# Patient Record
Sex: FEMALE | Race: WHITE | NOT HISPANIC OR LATINO | URBAN - METROPOLITAN AREA
[De-identification: names, ages, dates, MRNs, and addresses within clinical notes are randomized per-mention and may not be internally consistent; named-entity substitution may affect disease eponyms.]

---

## 2017-09-13 ENCOUNTER — OFFICE (AMBULATORY)
Dept: URBAN - METROPOLITAN AREA CLINIC 64 | Facility: CLINIC | Age: 42
End: 2017-09-13
Payer: COMMERCIAL

## 2017-09-13 VITALS
WEIGHT: 230 LBS | HEIGHT: 65 IN | DIASTOLIC BLOOD PRESSURE: 68 MMHG | SYSTOLIC BLOOD PRESSURE: 104 MMHG | HEART RATE: 74 BPM

## 2017-09-13 DIAGNOSIS — K21.9 GASTRO-ESOPHAGEAL REFLUX DISEASE WITHOUT ESOPHAGITIS: ICD-10-CM

## 2017-09-13 DIAGNOSIS — E73.9 LACTOSE INTOLERANCE, UNSPECIFIED: ICD-10-CM

## 2017-09-13 DIAGNOSIS — R14.0 ABDOMINAL DISTENSION (GASEOUS): ICD-10-CM

## 2017-09-13 DIAGNOSIS — K31.84 GASTROPARESIS: ICD-10-CM

## 2017-09-13 DIAGNOSIS — K58.9 IRRITABLE BOWEL SYNDROME WITHOUT DIARRHEA: ICD-10-CM

## 2017-09-13 DIAGNOSIS — R10.11 RIGHT UPPER QUADRANT PAIN: ICD-10-CM

## 2017-09-13 PROCEDURE — 99214 OFFICE O/P EST MOD 30 MIN: CPT | Performed by: NURSE PRACTITIONER

## 2017-09-13 RX ORDER — PANTOPRAZOLE SODIUM 40 MG/1
40 TABLET, DELAYED RELEASE ORAL
Qty: 90 | Refills: 3 | Status: COMPLETED
End: 2017-09-25

## 2017-09-13 RX ORDER — LACTASE 3000 UNIT
TABLET ORAL
Qty: 30 | Refills: -1 | Status: ACTIVE
Start: 2017-09-13

## 2018-02-21 ENCOUNTER — OFFICE (AMBULATORY)
Dept: URBAN - METROPOLITAN AREA CLINIC 64 | Facility: CLINIC | Age: 43
End: 2018-02-21
Payer: COMMERCIAL

## 2018-02-21 VITALS
SYSTOLIC BLOOD PRESSURE: 104 MMHG | WEIGHT: 230 LBS | DIASTOLIC BLOOD PRESSURE: 74 MMHG | HEART RATE: 74 BPM | HEIGHT: 65 IN

## 2018-02-21 DIAGNOSIS — K21.9 GASTRO-ESOPHAGEAL REFLUX DISEASE WITHOUT ESOPHAGITIS: ICD-10-CM

## 2018-02-21 DIAGNOSIS — K59.00 CONSTIPATION, UNSPECIFIED: ICD-10-CM

## 2018-02-21 PROCEDURE — 99214 OFFICE O/P EST MOD 30 MIN: CPT | Performed by: NURSE PRACTITIONER

## 2018-02-21 RX ORDER — DICYCLOMINE HYDROCHLORIDE 20 MG/1
TABLET ORAL
Qty: 90 | Refills: 3 | Status: COMPLETED
End: 2023-01-12

## 2018-03-09 ENCOUNTER — OFFICE (AMBULATORY)
Dept: URBAN - METROPOLITAN AREA CLINIC 64 | Facility: CLINIC | Age: 43
End: 2018-03-09
Payer: COMMERCIAL

## 2018-03-09 VITALS
DIASTOLIC BLOOD PRESSURE: 70 MMHG | HEART RATE: 72 BPM | SYSTOLIC BLOOD PRESSURE: 108 MMHG | HEIGHT: 65 IN | WEIGHT: 221 LBS

## 2018-03-09 DIAGNOSIS — K31.84 GASTROPARESIS: ICD-10-CM

## 2018-03-09 DIAGNOSIS — R14.0 ABDOMINAL DISTENSION (GASEOUS): ICD-10-CM

## 2018-03-09 DIAGNOSIS — K58.9 IRRITABLE BOWEL SYNDROME WITHOUT DIARRHEA: ICD-10-CM

## 2018-03-09 DIAGNOSIS — K21.9 GASTRO-ESOPHAGEAL REFLUX DISEASE WITHOUT ESOPHAGITIS: ICD-10-CM

## 2018-03-09 DIAGNOSIS — R10.11 RIGHT UPPER QUADRANT PAIN: ICD-10-CM

## 2018-03-09 PROCEDURE — 99214 OFFICE O/P EST MOD 30 MIN: CPT | Performed by: NURSE PRACTITIONER

## 2019-09-06 ENCOUNTER — OFFICE (AMBULATORY)
Dept: URBAN - METROPOLITAN AREA CLINIC 64 | Facility: CLINIC | Age: 44
End: 2019-09-06
Payer: COMMERCIAL

## 2019-09-06 VITALS
HEIGHT: 65 IN | HEART RATE: 80 BPM | WEIGHT: 218 LBS | DIASTOLIC BLOOD PRESSURE: 72 MMHG | SYSTOLIC BLOOD PRESSURE: 109 MMHG

## 2019-09-06 DIAGNOSIS — R11.0 NAUSEA: ICD-10-CM

## 2019-09-06 DIAGNOSIS — R14.0 ABDOMINAL DISTENSION (GASEOUS): ICD-10-CM

## 2019-09-06 DIAGNOSIS — K21.9 GASTRO-ESOPHAGEAL REFLUX DISEASE WITHOUT ESOPHAGITIS: ICD-10-CM

## 2019-09-06 DIAGNOSIS — K59.00 CONSTIPATION, UNSPECIFIED: ICD-10-CM

## 2019-09-06 PROCEDURE — 99214 OFFICE O/P EST MOD 30 MIN: CPT | Performed by: NURSE PRACTITIONER

## 2019-09-06 RX ORDER — OMEPRAZOLE 40 MG/1
CAPSULE, DELAYED RELEASE ORAL
Qty: 90 | Refills: 4 | Status: COMPLETED
Start: 2017-09-25 | End: 2020-10-13

## 2019-09-06 RX ORDER — DICYCLOMINE HYDROCHLORIDE 20 MG/1
TABLET ORAL
Qty: 90 | Refills: 3 | Status: COMPLETED
End: 2023-01-12

## 2019-09-06 RX ORDER — METOCLOPRAMIDE 5 MG/1
5 TABLET ORAL
Qty: 90 | Refills: 3 | Status: COMPLETED
End: 2021-12-03

## 2020-11-13 ENCOUNTER — OFFICE (AMBULATORY)
Dept: URBAN - METROPOLITAN AREA CLINIC 64 | Facility: CLINIC | Age: 45
End: 2020-11-13

## 2020-11-13 VITALS
WEIGHT: 239 LBS | HEART RATE: 75 BPM | HEIGHT: 65 IN | DIASTOLIC BLOOD PRESSURE: 87 MMHG | SYSTOLIC BLOOD PRESSURE: 128 MMHG

## 2020-11-13 DIAGNOSIS — K58.9 IRRITABLE BOWEL SYNDROME WITHOUT DIARRHEA: ICD-10-CM

## 2020-11-13 DIAGNOSIS — R11.0 NAUSEA: ICD-10-CM

## 2020-11-13 DIAGNOSIS — K21.9 GASTRO-ESOPHAGEAL REFLUX DISEASE WITHOUT ESOPHAGITIS: ICD-10-CM

## 2020-11-13 DIAGNOSIS — K31.84 GASTROPARESIS: ICD-10-CM

## 2020-11-13 PROCEDURE — 99214 OFFICE O/P EST MOD 30 MIN: CPT | Performed by: NURSE PRACTITIONER

## 2020-11-13 RX ORDER — METOCLOPRAMIDE 5 MG/1
5 TABLET ORAL
Qty: 90 | Refills: 3 | Status: COMPLETED
End: 2021-12-03

## 2020-11-13 RX ORDER — PANTOPRAZOLE SODIUM 40 MG/1
TABLET, DELAYED RELEASE ORAL
Qty: 30 | Refills: 0 | Status: COMPLETED
Start: 2019-10-09 | End: 2020-11-13

## 2020-11-13 RX ORDER — ONDANSETRON HYDROCHLORIDE 8 MG/1
24 TABLET, FILM COATED ORAL
Qty: 30 | Refills: 3 | Status: COMPLETED
Start: 2018-02-05 | End: 2021-02-26

## 2020-11-13 RX ORDER — DICYCLOMINE HYDROCHLORIDE 20 MG/1
TABLET ORAL
Qty: 90 | Refills: 3 | Status: COMPLETED
End: 2023-01-12

## 2020-11-13 RX ORDER — OMEPRAZOLE 40 MG/1
40 CAPSULE, DELAYED RELEASE ORAL
Qty: 90 | Refills: 3 | Status: COMPLETED
Start: 2020-11-13 | End: 2021-03-04

## 2021-04-21 PROBLEM — E66.813 OBESITY, CLASS III, BMI 40-49.9 (MORBID OBESITY): Chronic | Status: ACTIVE | Noted: 2021-04-21

## 2021-12-03 ENCOUNTER — OFFICE (AMBULATORY)
Dept: URBAN - METROPOLITAN AREA CLINIC 64 | Facility: CLINIC | Age: 46
End: 2021-12-03

## 2021-12-03 VITALS
DIASTOLIC BLOOD PRESSURE: 91 MMHG | HEART RATE: 91 BPM | SYSTOLIC BLOOD PRESSURE: 127 MMHG | WEIGHT: 253 LBS | HEIGHT: 65 IN

## 2021-12-03 DIAGNOSIS — K31.84 GASTROPARESIS: ICD-10-CM

## 2021-12-03 DIAGNOSIS — K59.00 CONSTIPATION, UNSPECIFIED: ICD-10-CM

## 2021-12-03 DIAGNOSIS — K21.9 GASTRO-ESOPHAGEAL REFLUX DISEASE WITHOUT ESOPHAGITIS: ICD-10-CM

## 2021-12-03 PROCEDURE — 99214 OFFICE O/P EST MOD 30 MIN: CPT | Performed by: NURSE PRACTITIONER

## 2021-12-03 RX ORDER — METOCLOPRAMIDE 5 MG/1
5 TABLET ORAL
Qty: 90 | Refills: 3 | Status: COMPLETED
End: 2021-12-03

## 2021-12-03 RX ORDER — DICYCLOMINE HYDROCHLORIDE 20 MG/1
TABLET ORAL
Qty: 90 | Refills: 3 | Status: COMPLETED
End: 2023-01-12

## 2021-12-03 RX ORDER — PANTOPRAZOLE SODIUM 40 MG/1
40 TABLET, DELAYED RELEASE ORAL
Qty: 90 | Refills: 4 | Status: ACTIVE
Start: 2021-10-05

## 2023-01-12 ENCOUNTER — OFFICE (AMBULATORY)
Dept: URBAN - METROPOLITAN AREA CLINIC 64 | Facility: CLINIC | Age: 48
End: 2023-01-12

## 2023-01-12 VITALS
HEIGHT: 65 IN | DIASTOLIC BLOOD PRESSURE: 89 MMHG | SYSTOLIC BLOOD PRESSURE: 121 MMHG | HEART RATE: 75 BPM | WEIGHT: 244 LBS

## 2023-01-12 DIAGNOSIS — R13.10 DYSPHAGIA, UNSPECIFIED: ICD-10-CM

## 2023-01-12 DIAGNOSIS — K58.9 IRRITABLE BOWEL SYNDROME WITHOUT DIARRHEA: ICD-10-CM

## 2023-01-12 DIAGNOSIS — E73.9 LACTOSE INTOLERANCE, UNSPECIFIED: ICD-10-CM

## 2023-01-12 DIAGNOSIS — K31.84 GASTROPARESIS: ICD-10-CM

## 2023-01-12 DIAGNOSIS — K21.9 GASTRO-ESOPHAGEAL REFLUX DISEASE WITHOUT ESOPHAGITIS: ICD-10-CM

## 2023-01-12 PROCEDURE — 99214 OFFICE O/P EST MOD 30 MIN: CPT | Performed by: NURSE PRACTITIONER

## 2023-01-12 RX ORDER — PANTOPRAZOLE SODIUM 40 MG/1
40 TABLET, DELAYED RELEASE ORAL
Qty: 90 | Refills: 4 | Status: ACTIVE
Start: 2021-10-05

## 2023-01-12 RX ORDER — DICYCLOMINE HYDROCHLORIDE 20 MG/1
TABLET ORAL
Qty: 30 | Refills: 3 | Status: ACTIVE

## 2023-02-03 ENCOUNTER — ON CAMPUS - OUTPATIENT (AMBULATORY)
Dept: URBAN - METROPOLITAN AREA HOSPITAL 85 | Facility: HOSPITAL | Age: 48
End: 2023-02-03
Payer: COMMERCIAL

## 2023-02-03 DIAGNOSIS — R13.10 DYSPHAGIA, UNSPECIFIED: ICD-10-CM

## 2023-02-03 DIAGNOSIS — K21.9 GASTRO-ESOPHAGEAL REFLUX DISEASE WITHOUT ESOPHAGITIS: ICD-10-CM

## 2023-02-03 PROCEDURE — 43235 EGD DIAGNOSTIC BRUSH WASH: CPT | Performed by: INTERNAL MEDICINE

## 2023-02-03 PROCEDURE — 43450 DILATE ESOPHAGUS 1/MULT PASS: CPT | Performed by: INTERNAL MEDICINE

## 2024-04-19 ENCOUNTER — OFFICE VISIT (OUTPATIENT)
Dept: FAMILY MEDICINE CLINIC | Facility: CLINIC | Age: 49
End: 2024-04-19
Payer: COMMERCIAL

## 2024-04-19 ENCOUNTER — LAB (OUTPATIENT)
Dept: FAMILY MEDICINE CLINIC | Facility: CLINIC | Age: 49
End: 2024-04-19
Payer: COMMERCIAL

## 2024-04-19 VITALS
WEIGHT: 200.6 LBS | BODY MASS INDEX: 33.42 KG/M2 | OXYGEN SATURATION: 98 % | RESPIRATION RATE: 16 BRPM | DIASTOLIC BLOOD PRESSURE: 74 MMHG | SYSTOLIC BLOOD PRESSURE: 118 MMHG | HEIGHT: 65 IN | HEART RATE: 72 BPM

## 2024-04-19 DIAGNOSIS — E04.1 THYROID NODULE: Primary | ICD-10-CM

## 2024-04-19 DIAGNOSIS — E04.1 THYROID NODULE: ICD-10-CM

## 2024-04-19 DIAGNOSIS — K21.9 GASTROESOPHAGEAL REFLUX DISEASE WITHOUT ESOPHAGITIS: ICD-10-CM

## 2024-04-19 DIAGNOSIS — R07.0 THROAT DISCOMFORT: ICD-10-CM

## 2024-04-19 LAB — TSH SERPL DL<=0.05 MIU/L-ACNC: 1.35 UIU/ML (ref 0.27–4.2)

## 2024-04-19 PROCEDURE — 84443 ASSAY THYROID STIM HORMONE: CPT | Performed by: INTERNAL MEDICINE

## 2024-04-19 PROCEDURE — 36415 COLL VENOUS BLD VENIPUNCTURE: CPT

## 2024-04-19 PROCEDURE — 85025 COMPLETE CBC W/AUTO DIFF WBC: CPT | Performed by: INTERNAL MEDICINE

## 2024-04-19 PROCEDURE — 99214 OFFICE O/P EST MOD 30 MIN: CPT | Performed by: INTERNAL MEDICINE

## 2024-04-19 RX ORDER — ESCITALOPRAM OXALATE 20 MG/1
1 TABLET ORAL DAILY
COMMUNITY
End: 2024-04-19 | Stop reason: SDUPTHER

## 2024-04-19 NOTE — PROGRESS NOTES
Chief Complaint  Throat Concern    HPI:    Camila Srivastava presents to [facility]    Patient is a 48-year-old female with a history of hypertension, obesity, anxiety/depression, GERD, anemia presenting for evaluation of throat concern.     Patient has noticed that she has had longstanding intermittent, throat tightness/squeezing that has been ongoing. She feels that it has been ongoing over the last several months and has been getting worse. No associated pain. Nothing really makes better or worse. Denies cough, runny nose, congestion, sore throat, sinus pain/pressure, ear pain pressure, lymphadenopathy, myalgias, headache, and fatigue.  Denies fever, chills, nausea, vomiting. Denies recent sick contact or travel. Denies trauma, injury, shortnss of breath. She does have history of multinodular nodule with most recent ultrasound in 2021. Previous biopsy in 2021. No follow up since. No recent GERD symptoms and have been controlled on pantoprazole. Sometimes feels like she has diffiucly swallowing but is not associated with solids or liquids.  Patient previously had EGD performed on 2/3/2023 without definitive stricturing or erosive esophagitis identified.      Review of Systems:  ROS negative unless otherwise noted in HPI above.    Past Medical History:   Diagnosis Date    Allergic     Anemia     Anxiety     Arthritis     Cervical disc disorder     cpap     Depression     Fibromyalgia     Fibromyalgia, primary     GERD (gastroesophageal reflux disease)     Headache     HL (hearing loss)     IBS (irritable bowel syndrome)     Irritable bowel syndrome     Kidney stone     Obesity     Plantar fasciitis     Shingles     hx    Stroke     TIA (transient ischemic attack)     Vertigo     Vitamin B12 deficiency     Vitamin D deficiency          Current Outpatient Medications:     buPROPion XL (WELLBUTRIN XL) 150 MG 24 hr tablet, TAKE 1 TABLET BY MOUTH ONCE DAILY IN THE MORNING, Disp: 90  tablet, Rfl: 0    busPIRone (BUSPAR) 15 MG tablet, Take 1 tablet by mouth 2 (Two) Times a Day., Disp: 180 tablet, Rfl: 1    Cholecalciferol (VITAMIN D) 2000 units tablet, Take 1 tablet by mouth Daily., Disp: , Rfl:     diclofenac (VOLTAREN) 75 MG EC tablet, Take 1 tablet by mouth 2 (Two) Times a Day With Meals., Disp: 180 tablet, Rfl: 0    dicyclomine (BENTYL) 20 MG tablet, Take 1 tablet by mouth Daily. lunch, Disp: 30 tablet, Rfl: 2    dicyclomine (BENTYL) 20 MG tablet, Take 1 tablet by mouth Every 8 (Eight) Hours As Needed for Abdominal Cramping., Disp: 21 tablet, Rfl: 0    escitalopram (LEXAPRO) 20 MG tablet, Take 1 tablet by mouth Daily., Disp: 90 tablet, Rfl: 3    ferrous sulfate 325 (65 FE) MG tablet, Take 1 tablet by mouth 1 (One) Time Per Week., Disp: , Rfl:     methylPREDNISolone (MEDROL) 4 MG dose pack, Take as directed on package instructions. (Patient not taking: Reported on 12/24/2023), Disp: 21 tablet, Rfl: 0    multivitamin with minerals tablet tablet, Take 1 tablet by mouth Daily., Disp: , Rfl:     ondansetron ODT (ZOFRAN-ODT) 8 MG disintegrating tablet, Place 1 tablet on the tongue As Needed., Disp: , Rfl:     pantoprazole (PROTONIX) 40 MG EC tablet, Take 1 tablet by mouth every morning, Disp: 30 tablet, Rfl: 11    predniSONE (DELTASONE) 20 MG tablet, Take 1 po q day for 7 days in morning with food., Disp: 7 tablet, Rfl: 0    pseudoephedrine (Sudafed 12 Hour) 120 MG 12 hr tablet, Take 1 tablet by mouth Every 12 (Twelve) Hours., Disp: 20 tablet, Rfl: 0    Semaglutide-Weight Management (Wegovy) 1 MG/0.5ML solution auto-injector, Inject 0.5 mL under the skin into the appropriate area as directed 1 (One) Time Per Week., Disp: 6 mL, Rfl: 1    SM ALLERGY RELIEF 50 MCG/ACT nasal spray, 1 spray into the nostril(s) as directed by provider Daily As Needed., Disp: , Rfl: 0    Current Facility-Administered Medications:     cyanocobalamin injection 1,000 mcg, 1,000 mcg, Intramuscular, Q28 Days, [place],  "Khadra RICHARDSON MD    Social History     Socioeconomic History    Marital status:    Tobacco Use    Smoking status: Former     Current packs/day: 0.00     Average packs/day: 1 pack/day for 20.0 years (20.0 ttl pk-yrs)     Types: Cigarettes     Start date: 1995     Quit date: 2015     Years since quittin.3    Smokeless tobacco: Never   Vaping Use    Vaping status: Never Used   Substance and Sexual Activity    Alcohol use: Yes     Alcohol/week: 1.0 standard drink of alcohol     Types: 1 Drinks containing 0.5 oz of alcohol per week     Comment: rare    Drug use: Never    Sexual activity: Yes     Partners: Male     Birth control/protection: None, Hysterectomy        Objective   Vital Signs:  There were no vitals taken for this visit.  Estimated body mass index is 35.28 kg/m² as calculated from the following:    Height as of 23: 165.1 cm (65\").    Weight as of 23: 96.2 kg (212 lb).    Physical Exam:  General: Well-appearing patient, no apparent distress  HEENT: No posterior pharynx erythema, no tonsillar erythema or exudates, normal external auditory canals, TM normal without bulging or erythema, no palpable thyroid masses or nodules  Neck: No cervical lymphadenopathy  Cardiac: Regular rate and rhythm, normal S1/S2, no murmur, rubs or gallops, no lower extremity edema  Lungs: Clear to auscultation bilaterally, no crackles or wheezes  Abdomen: Soft, non-tender, no guarding or rebound tenderness, no hepatosplenomegaly  Skin: No significant rashes or lesions  MSK: Grossly normal tone and strength  Neuro: Alert and oriented x3, CN II-XII grossly intact  Psych: Appropriate mood and affect    Assessment and Plan:    (E04.1) Thyroid nodule -   Assessment: Patient with history of thyroid nodules status post FNA. Previously decided with prior PCP for monitoring. No recent imaging on file. Proceeding with repeat thyroid ultrasound given throat discomfort.   Plan:   - TSH Rfx On Abnormal To Free T4  - US " Thyroid  - Future plans pending result    (R07.0) Throat discomfort -   Assessment: Patient with ongoing throat discomfort that is overall worsening. No significant findings on exam. No systemic or constitutional symptoms. Unclear etiology but patient does have history of thyroid nodule and GERD. Proceeding with thyroid workup and referring to ENT for further considerations, including imaging.   Plan:   - CBC & Differential  - Thyroid workup as above  - Hold on imaging for now; possibly consider CT neck with contrast  - Ambulatory referral to ENT    (K21.9) Gastroesophageal reflux disease without esophagitis   Assessment: Patient previously followed with GI and underwent EGD in 2023 without significant findings. Reflux symptoms well controlled with PPI. No red flag symptoms.   Plan:  - Continue PPI  - Avoid potential triggers    Patient was given instructions and counseling regarding her condition or for health maintenance advice. Please see specific information pulled into the AVS if appropriate.       Dr Gold Arambula   Internal Medicine Physician  [facility]  [address], [address]  [place] [address]

## 2024-04-19 NOTE — PATIENT INSTRUCTIONS
Please stop at lab on second floor to have blood drawn    Medications:  Continue current medications as prescribed    Follow up for thyroid ultrasound as scheduled    Follow up with ENT    Encourage healthy diet and exercise    Follow up in 3 months for preventative care visit

## 2024-04-20 LAB
BASOPHILS # BLD AUTO: 0.04 10*3/MM3 (ref 0–0.2)
BASOPHILS NFR BLD AUTO: 0.7 % (ref 0–1.5)
DEPRECATED RDW RBC AUTO: 35.7 FL (ref 37–54)
EOSINOPHIL # BLD AUTO: 0.11 10*3/MM3 (ref 0–0.4)
EOSINOPHIL NFR BLD AUTO: 1.8 % (ref 0.3–6.2)
ERYTHROCYTE [DISTWIDTH] IN BLOOD BY AUTOMATED COUNT: 12 % (ref 12.3–15.4)
HCT VFR BLD AUTO: 40 % (ref 34–46.6)
HGB BLD-MCNC: 13.2 G/DL (ref 12–15.9)
IMM GRANULOCYTES # BLD AUTO: 0.01 10*3/MM3 (ref 0–0.05)
IMM GRANULOCYTES NFR BLD AUTO: 0.2 % (ref 0–0.5)
LYMPHOCYTES # BLD AUTO: 1.88 10*3/MM3 (ref 0.7–3.1)
LYMPHOCYTES NFR BLD AUTO: 31.1 % (ref 19.6–45.3)
MCH RBC QN AUTO: 27.6 PG (ref 26.6–33)
MCHC RBC AUTO-ENTMCNC: 33 G/DL (ref 31.5–35.7)
MCV RBC AUTO: 83.7 FL (ref 79–97)
MONOCYTES # BLD AUTO: 0.5 10*3/MM3 (ref 0.1–0.9)
MONOCYTES NFR BLD AUTO: 8.3 % (ref 5–12)
NEUTROPHILS NFR BLD AUTO: 3.51 10*3/MM3 (ref 1.7–7)
NEUTROPHILS NFR BLD AUTO: 57.9 % (ref 42.7–76)
NRBC BLD AUTO-RTO: 0 /100 WBC (ref 0–0.2)
PLATELET # BLD AUTO: 172 10*3/MM3 (ref 140–450)
PMV BLD AUTO: 12.2 FL (ref 6–12)
RBC # BLD AUTO: 4.78 10*6/MM3 (ref 3.77–5.28)
WBC NRBC COR # BLD AUTO: 6.05 10*3/MM3 (ref 3.4–10.8)

## 2024-04-24 ENCOUNTER — HOSPITAL ENCOUNTER (OUTPATIENT)
Dept: ULTRASOUND IMAGING | Facility: HOSPITAL | Age: 49
Discharge: HOME | End: 2024-04-24
Admitting: INTERNAL MEDICINE
Payer: COMMERCIAL

## 2024-04-24 PROCEDURE — 76536 US EXAM OF HEAD AND NECK: CPT

## 2024-05-01 ENCOUNTER — PATIENT MESSAGE (OUTPATIENT)
Dept: FAMILY MEDICINE CLINIC | Facility: CLINIC | Age: 49
End: 2024-05-01
Payer: COMMERCIAL

## 2024-05-01 NOTE — TELEPHONE ENCOUNTER
From: Camila Srivastava  To: Gold Arambula  Sent: 5/1/2024 8:24 AM EDT  Subject: Nodules    I have a Ent appt May 24th. Then we can see what to do?      Ruddy Srivastava

## 2024-05-02 ENCOUNTER — PATIENT MESSAGE (OUTPATIENT)
Dept: FAMILY MEDICINE CLINIC | Facility: CLINIC | Age: 49
End: 2024-05-02
Payer: COMMERCIAL

## 2024-05-02 RX ORDER — BUPROPION HYDROCHLORIDE 150 MG/1
300 TABLET ORAL EVERY MORNING
Qty: 90 TABLET | Refills: 0 | Status: SHIPPED | OUTPATIENT
Start: 2024-05-02

## 2024-05-02 RX ORDER — SEMAGLUTIDE 1.7 MG/.75ML
1.7 INJECTION, SOLUTION SUBCUTANEOUS WEEKLY
Qty: 0.75 ML | Refills: 3 | Status: SHIPPED | OUTPATIENT
Start: 2024-05-02

## 2024-05-02 NOTE — TELEPHONE ENCOUNTER
From: Camila Srivastava  To: Gold Arambula  Sent: 5/2/2024 11:02 AM EDT  Subject: Medicine change    Can I increase my Wegovy and Wellbrutin? Feeling more depressed and I have still not loosing as much as I would like to.    Ruddy Srivastava

## 2024-05-24 RX ORDER — DICLOFENAC SODIUM 75 MG/1
75 TABLET, DELAYED RELEASE ORAL 2 TIMES DAILY WITH MEALS
Qty: 180 TABLET | Refills: 0 | Status: SHIPPED | OUTPATIENT
Start: 2024-05-24

## 2024-07-06 ENCOUNTER — E-VISIT (OUTPATIENT)
Dept: FAMILY MEDICINE CLINIC | Facility: TELEHEALTH | Age: 49
End: 2024-07-06
Payer: COMMERCIAL

## 2024-07-06 NOTE — EXTERNAL PATIENT INSTRUCTIONS
View Doctor's Note     Diagnosis   COVID-19 infection   My name is Renetta SchmitzCECILIA, and I'm a healthcare provider at [facility]. I've reviewed your interview. Based on your responses and recent positive COVID-19 test, I see that your symptoms are caused by a COVID-19 infection.   About your diagnosis   Common symptoms of COVID-19 include fever, cough, shortness of breath, fatigue, muscle or body aches, headaches, new loss of sense of taste or smell, sore throat, stuffy or runny nose, nausea or vomiting, and diarrhea. Most people who get COVID-19 have mild symptoms and can rest at home until they get better. Elderly people and those with chronic medical problems may be at risk for more serious complications.   People who've had COVID-19 should still get vaccinated to protect themselves. It's possible to be infected by the COVID-19 virus more than once.   People who've recently had COVID-19 should wait to get vaccinated until they've recovered and completed their isolation period.   For more information about how to get a COVID-19 vaccine, visit [facility]'s website. To find a COVID-19 vaccination site near you, visit www.vaccines.gov/  , call 1-448.697.2937  , or text your zip code to [phone] (Meetmeals). Message and data rates may apply.   What to expect   Follow the advice in the treatment section below and you should feel better within 7 to 14 days. You may continue to feel tired and have a cough for several weeks.   I've given you a doctor's note for 3 days.   Medications   Your pharmacy   [facility] [phone] [place] [address] [phone]     Prescription   Ibuprofen (800mg): Take 1 tablet by mouth every 8 hours as needed for up to 10 days for any fever, pain, or discomfort associated with your condition. Do not exceed 3200mg (4 tablets) in a 24-hour period.   Benzonatate (200mg): Take 1 capsule by mouth 3 times a day as needed for cough. Do not chew or cut these  capsules.    I've given you a prescription dose of ibuprofen. If it's more affordable or convenient, you may use the equivalent amount of non-prescription ibuprofen. For example, instead of taking one 800 mg ibuprofen tablet, you may take four 200 mg ibuprofen tablets.   Non-prescription   Pseudoephedrine ER (120mg): Take 1 tablet by mouth every 12 hours as needed for nasal congestion for up to 5 days. Do not exceed 240mg (2 pills) in a 24-hour period.   Mucus Relief ER oral tablet, extended release (600mg): Take 1 tablet by mouth every 12 hours as needed for cough and congestion.    In case you develop nasal congestion, I've recommended a decongestant (pseudoephedrine). Although this is an over-the-counter medication, it's kept behind the pharmacist's counter. If you develop congestion, ask the pharmacist for pseudoephedrine.   Other treatment    Rest and drink plenty of sugar-free fluids.    Gargle with salt water several times a day to help your throat feel better. Cough drops and throat lozenges may provide extra relief. A teaspoon of honey stirred into warm water or weak tea can help soothe a sore throat and cough.    If your nose or sinuses become very stuffy, try using a Neti Pot to flush them out. Neti Pots are available at any drugstore without a prescription.    Avoid smoke and air pollution. Smoke can make infections worse.   When to seek care   Call us at [phone]   with any sudden or unexpected symptoms.   Call your healthcare provider immediately if you have any of the following:    Fever over 103F    Fever that doesn't come down when you take Tylenol or ibuprofen    Fever that returns after being gone for more than 24 hours    Fever for more than 4 days    Worsening shortness of breath or difficulty breathing   Go to your nearest ER or call 911 if you have any of the following:    Shortness of breath that makes it difficult to do simple things like get dressed, bathe, or comb your hair     Persistent chest pain or chest tightness    New confusion or difficulty staying alert    Bluish color to the lips or face   Preventing the spread of respiratory viruses   COVID-19, the flu, and other respiratory illnesses can have similar symptoms. These include fever, cough, shortness of breath, fatigue, muscle or body aches, headaches, new loss of sense of taste or smell, sore throat, stuffy or runny nose, nausea or vomiting, and diarrhea. Most people with a respiratory infection have mild symptoms and can rest at home until they get better. Elderly people and those with chronic medical problems may be at risk for more serious complications. It's important to take steps to prevent the spread of respiratory illness.   When you have symptoms of a respiratory virus:   Stay home and away from others, including people you live with who aren't sick. This is called isolation. You can stop isolation and go back to normal activities when both of these are true:    You've been feeling better overall for 24 hours    You've had no fever for 24 hours, and you're not using fever-reducing medication like Tylenol or ibuprofen   Then, for the next 5 days:    Open windows at home when possible, and use an air purifier    Consider wearing a face mask when around other people indoors    Take a COVID-19 test before being around other people indoors    Keep a physical distance from people   If you start to feel worse or get a fever again, you need to start isolation over again.   If you've had close contact with someone who has COVID, but you don't have symptoms:   You don't need to quarantine. You should take a COVID-19 test at least 5 days after the last close contact.   If you test positive for COVID but don't have symptoms:   You might be contagious. For the next 5 days:    Open windows at home when possible, and use an air purifier    Consider wearing a face mask when around other people indoors    Take a COVID-19 test before  being around other people indoors    Keep a physical distance from people   Other tips to prevent the spread of respiratory viruses    Cover your mouth and nose with a tissue when you cough or sneeze. Throw used tissues in a lined trash can right away, and wash your hands immediately after.    Wash your hands often with soap and water for at least 20 seconds. If soap and water aren't available, clean your hands with a hand  that contains at least 60% alcohol. Cover all surfaces of your hands and rub them together until they feel dry.    Avoid touching your face, especially your eyes, nose, and mouth.    Clean high-touch surfaces daily. High-touch surfaces include counters, tabletops, doorknobs, bathroom fixtures, toilets, phones, keyboards, tablets, and bedside tables. You can use soap, detergents, 60%-80% ethanol or isopropyl alcohol,  such as Windex, or bleach. All of these  are effective at killing the virus that causes COVID-19.   COVID-19 vaccine information   COVID-19 vaccines are safe, effective, and free. Everyone 6 months or older can get an updated COVID-19 vaccine. Visit www.cdc.gov/coronavirus/2019-ncov/vaccines/stay-up-to-date.html   to find out how to stay up to date with your COVID-19 vaccines. Immunocompromised people can visit www.cdc.gov/coronavirus/2019-ncov/vaccines/recommendations/immuno.html  .   Side effects such as a sore arm, tiredness, headache, and muscle pain may occur within two days of getting the vaccine and last a day or two. For the Moderna or Pfizer vaccines, side effects are more common after the second dose. People over the age of 55 are less likely to have side effects than younger people.   People who've had COVID-19 should still get vaccinated to protect themselves. It's possible to be infected by the COVID-19 virus more than once.   People who've recently had COVID-19 should wait to get vaccinated until they've recovered and completed their  isolation period.   To find a COVID-19 vaccination site near you, visit www.vaccines.gov/  , call [phone]  , or text your zip code to [phone] (SlickLogin). Message and data rates may apply.   Flu vaccine information   Everyone 6 months of age and older should get a yearly flu vaccine. Children younger than 6 months old can't get the flu vaccine. This means infants are at high risk of serious complications from the flu. It's especially important for those who are around infants to get the flu vaccine.   It's best to get a flu shot by the end of October. Once you're vaccinated, it takes about two weeks for antibodies to develop and protect you against the flu. That's why it's important to get vaccinated as soon as possible.   Even if you don't get a flu shot by the end of October, you should still get one. As long as the flu viruses are still in your community, flu vaccines will remain available, even into January of the following year or later.   You need to get a flu shot every year. Flu viruses are constantly changing, so flu vaccines are usually updated from one season to the next. Your protection from the flu vaccine also lessens over time.   Common side effects of the flu shot include soreness, redness and/or swelling where the shot was given, low grade fever, and aches. Common side effects of the nasal spray flu vaccine for adults include runny nose, headaches, sore throat, and cough. For children, side effects include wheezing, vomiting, muscle aches, and fever.   The flu vaccine is safe and effective. You can't get the flu from a flu vaccine.   It's safe to get the flu vaccine at the same time as a COVID-19 vaccine.   Contact your healthcare provider today for details on when and where to get your flu vaccine.   Your provider   Your diagnosis was provided by CECILIA Masterson, a member of your trusted care team at [facility].   If you have any questions, call us at [phone]  .   View Doctor's  Note     Expires on 08/05/24

## 2024-08-02 RX ORDER — BUPROPION HYDROCHLORIDE 150 MG/1
150 TABLET ORAL EVERY MORNING
Qty: 90 TABLET | Refills: 0 | Status: SHIPPED | OUTPATIENT
Start: 2024-08-02

## 2024-08-05 ENCOUNTER — PATIENT MESSAGE (OUTPATIENT)
Dept: FAMILY MEDICINE CLINIC | Facility: CLINIC | Age: 49
End: 2024-08-05
Payer: COMMERCIAL

## 2024-08-21 ENCOUNTER — PATIENT MESSAGE (OUTPATIENT)
Dept: FAMILY MEDICINE CLINIC | Facility: CLINIC | Age: 49
End: 2024-08-21
Payer: COMMERCIAL

## 2024-08-21 RX ORDER — BUPROPION HYDROCHLORIDE 150 MG/1
300 TABLET ORAL EVERY MORNING
Qty: 180 TABLET | Refills: 0 | Status: SHIPPED | OUTPATIENT
Start: 2024-08-21

## 2024-09-25 ENCOUNTER — OFFICE VISIT (OUTPATIENT)
Dept: FAMILY MEDICINE CLINIC | Facility: CLINIC | Age: 49
End: 2024-09-25
Payer: COMMERCIAL

## 2024-09-25 VITALS
DIASTOLIC BLOOD PRESSURE: 82 MMHG | SYSTOLIC BLOOD PRESSURE: 118 MMHG | WEIGHT: 196.4 LBS | RESPIRATION RATE: 18 BRPM | OXYGEN SATURATION: 100 % | HEIGHT: 65 IN | HEART RATE: 79 BPM | BODY MASS INDEX: 32.72 KG/M2

## 2024-09-25 DIAGNOSIS — M79.605 PAIN IN BOTH LOWER EXTREMITIES: Primary | ICD-10-CM

## 2024-09-25 DIAGNOSIS — M54.41 CHRONIC BILATERAL LOW BACK PAIN WITH BILATERAL SCIATICA: ICD-10-CM

## 2024-09-25 DIAGNOSIS — M54.42 CHRONIC BILATERAL LOW BACK PAIN WITH BILATERAL SCIATICA: ICD-10-CM

## 2024-09-25 DIAGNOSIS — G89.29 CHRONIC BILATERAL LOW BACK PAIN WITH BILATERAL SCIATICA: ICD-10-CM

## 2024-09-25 DIAGNOSIS — M79.604 PAIN IN BOTH LOWER EXTREMITIES: Primary | ICD-10-CM

## 2024-09-25 PROCEDURE — 99213 OFFICE O/P EST LOW 20 MIN: CPT | Performed by: INTERNAL MEDICINE

## 2024-09-25 RX ORDER — CYCLOBENZAPRINE HCL 10 MG
10 TABLET ORAL 3 TIMES DAILY PRN
Qty: 30 TABLET | Refills: 0 | Status: SHIPPED | OUTPATIENT
Start: 2024-09-25

## 2024-09-25 RX ORDER — IBUPROFEN 800 MG/1
TABLET, FILM COATED ORAL
COMMUNITY
Start: 2024-07-07

## 2024-09-25 RX ORDER — PREDNISONE 20 MG/1
40 TABLET ORAL DAILY
Qty: 10 TABLET | Refills: 0 | Status: SHIPPED | OUTPATIENT
Start: 2024-09-25

## 2024-09-25 RX ORDER — ALBUTEROL SULFATE 90 UG/1
INHALANT RESPIRATORY (INHALATION)
COMMUNITY
Start: 2024-07-06

## 2024-10-03 RX ORDER — DICLOFENAC SODIUM 75 MG/1
75 TABLET, DELAYED RELEASE ORAL 2 TIMES DAILY WITH MEALS
Qty: 180 TABLET | Refills: 0 | Status: SHIPPED | OUTPATIENT
Start: 2024-10-03

## 2024-10-14 ENCOUNTER — TREATMENT (OUTPATIENT)
Dept: PHYSICAL THERAPY | Facility: CLINIC | Age: 49
End: 2024-10-14
Payer: COMMERCIAL

## 2024-10-14 DIAGNOSIS — M54.50 CHRONIC LEFT-SIDED LOW BACK PAIN, UNSPECIFIED WHETHER SCIATICA PRESENT: ICD-10-CM

## 2024-10-14 DIAGNOSIS — M79.604 PAIN IN BOTH LOWER EXTREMITIES: Primary | ICD-10-CM

## 2024-10-14 DIAGNOSIS — M53.3 SI (SACROILIAC) JOINT DYSFUNCTION: ICD-10-CM

## 2024-10-14 DIAGNOSIS — M79.605 PAIN IN BOTH LOWER EXTREMITIES: Primary | ICD-10-CM

## 2024-10-14 DIAGNOSIS — G89.29 CHRONIC LEFT-SIDED LOW BACK PAIN, UNSPECIFIED WHETHER SCIATICA PRESENT: ICD-10-CM

## 2024-10-14 PROCEDURE — 97110 THERAPEUTIC EXERCISES: CPT | Performed by: PHYSICAL THERAPIST

## 2024-10-14 PROCEDURE — 97162 PT EVAL MOD COMPLEX 30 MIN: CPT | Performed by: PHYSICAL THERAPIST

## 2024-10-14 NOTE — PROGRESS NOTES
Physical Therapy Initial Evaluation and Plan of Care  [address] [place] [address]    Patient: Camila Srivastava   : 1975  Diagnosis/ICD-10 Code:  Pain in both lower extremities [M79.604, M79.605]  Referring practitioner: Gold Arambula MD  Date of Initial Visit: 10/14/2024  Today's Date: 10/14/2024  Patient seen for 1 sessions           Visit Diagnoses:     ICD-10-CM ICD-9-CM   1. Pain in both lower extremities  M79.604 729.5    M79.605    2. SI (sacroiliac) joint dysfunction  M53.3 724.6   3. Chronic left-sided low back pain, unspecified whether sciatica present  M54.50 724.2    G89.29 338.29        Subjective Questionnaire: LEFS: 59 = 73.75% ability/26.25% limited    Subjective Evaluation    History of Present Illness  Mechanism of injury: Pt reports B LE pain which began 24 and has recently significantly increased. Pt reports this started from being on her feet all day.      Pt has been having L LBP/L hip pain previously evaluated by neurosurgery on 2021 for back pain, left hip pain and left groin pain. Previous MRI lumbar spine on 2021 with multilevel degenerative changes of the lumbar spine.  Symptoms felt most likely secondary to trochanteric bursitis and was given Medrol Dosepak.  Follow-up recommended in 1 month, which was not performed (per MD 24 note). Pt states the steroids feel like a temporary fix when she takes them.    Pain is intermittent with aching, stabbing and radiation down the front of the legs to the ankle with occasional n/t L>R which varies how far it goes down the legs. Pt denies illness, injury, saddle anaesthesia, bowel/bladder dysfunction, or weakness. Pt reports the spine pops and cracks when she bends over now a lot.     Pt was prescribed Prednisone 40 mg daily x5 days (completed) & Cyclobenzaprine 10 mg 3x/day prn with instr to take Diclofenac as prescribed, activity modification, heat/ice prn, and ordered OPPT. Possible  future MRI if not improving.  Pt feels like it helps her control the pain a little. Pain is worse at night after standing all day.     Denies hx: pacemaker, metal implants, CA, seizures, MI, DM, latex allergies, pregnant, (not sure on OP, hasn't been tested, runs in family)    Pain: 3/10 current, 0/10 at best, 7-8/10 at worst    Aggravating/functional factors: sitting too long in one position, laying in bed in one position too long, standing, walking, bending/twisting for the back, stairs, in/out of car, rising, sleeping, house work, yard work, driving    PLOF: some prior issues with the above functional activities due to knee pain in the past    Relieving factors: elevating legs, rest, heat/ice, stretches help some    Social Hx: lives with spouse with 1 daughter almost 18 y/o;  stairs; job: [employer] with [employer] standing for 8 hrs (30 min lunch, two 15 min breaks); hobbies: watching TV, stopped exercising in July to avoid worsening, but does some stretches      Quality of life: good (excellent without these issues)    Pain  Progression: no change    Hand dominance: right    Treatments  Previous treatment: physical therapy and injection treatment  Patient Goals  Patient goals for therapy: decreased pain, improved balance, increased strength, return to sport/leisure activities and increased motion  Patient goal: know how to help it, return to PLOF       Past Medical History:   Diagnosis Date    Allergic     Anemia     Anxiety     Arthritis     Cervical disc disorder     cpap     Depression     Fibromyalgia     Fibromyalgia, primary     GERD (gastroesophageal reflux disease)     Headache     HL (hearing loss)     IBS (irritable bowel syndrome)     Irritable bowel syndrome     Kidney stone     Obesity     Plantar fasciitis     Shingles     hx    Stroke     TIA (transient ischemic attack)     Vertigo     Vitamin B12 deficiency     Vitamin D deficiency    Panic attacks  Allergies: Penicillin G/Penicillins and  "Oxycodone    Past Surgical History:   Procedure Laterality Date     SECTION      COLONOSCOPY      ENDOSCOPY N/A 2023    Procedure: ESOPHAGOGASTRODUODENOSCOPY WITH esophageal dilation #50 and #54 bougie;  Surgeon: Bobo Kennedy MD;  Location: [facility];  Service: Gastroenterology;  Laterality: N/A;  dysphagia    HYSTERECTOMY      US GUIDED FINE NEEDLE ASPIRATION  2021    thyroid    US GUIDED FINE NEEDLE ASPIRATION  2021       Objective          Active Range of Motion     Lumbar   Left lateral flexion: 22 degrees   Right lateral flexion: 35 degrees with pain  Left rotation: WFL  Right rotation: WFL  Left Hip   Flexion: 100 degrees   External rotation (90/90): 25 degrees with pain  Internal rotation (90/90): 27 degrees with pain    Right Hip   Flexion: 100 degrees   External rotation (90/90): 22 degrees   Internal rotation (90/90): 35 degrees   Left Knee   Extensor la degrees     Right Knee   Extensor la degrees     Additional Active Range of Motion Details  Flex hands to ~3\" above ankles; reps inc central LBP, no change in LE symptoms  Ext ~30%; reps inc central LBP, no change in LE symptoms  Hip pain L IR, LBP with L hip ER  Pain at knees & L LB    Strength/Myotome Testing     Left Hip   Planes of Motion   Flexion: 4  External rotation: 4-  Internal rotation: 4-    Right Hip   Planes of Motion   Flexion: 4  External rotation: 4  Internal rotation: 4    Left Knee   Flexion: 4+  Extension: 4+    Right Knee   Flexion: 4+  Extension: 4+    Left Ankle/Foot   Dorsiflexion: 5  Inversion: 5  Eversion: 5  Great toe extension: 4+    Right Ankle/Foot   Dorsiflexion: 5  Inversion: 5  Eversion: 5  Great toe extension: 4+    Additional Strength Details  Seated hip abd/ER elmer mod resistance B  Seated hip add/IR elmer min resistance B        Observation:     Palpation: TTP @ L/S & SI L; R outflare, L PINN, R on L/R on R sacrum    Sensation: intact/equal to LT B LE s with some " reduced sensation L vs R L2, L3, lat calf     Posture: head fwd/rounded shoulders; sits slouched with PPT & leaning slightly toward R LE    DTRs: 3+ patellar, 2+ achilles     Clonus: (-) one beat B     Gait: I without AD with reduced gt speed/step length, outflare noted    Balance: LOB noted with 1 rep of trunk flex return to neutral     Transfers: I sit to/from stand with UE prn    Flexibility: tight hams, quads, ITB, hip flexors        Assessment & Plan       Assessment  Impairments: abnormal coordination, abnormal gait, abnormal muscle firing, abnormal muscle tone, abnormal or restricted ROM, activity intolerance, impaired balance, impaired physical strength, lacks appropriate home exercise program, pain with function, safety issue and weight-bearing intolerance   Assessment details: The patient is a 48 y.o. female who presents to physical therapy today for pain in both lower extremities and PT added SI dysfunction and chronic LBP. Upon initial evaluation, the patient demonstrates the above & following impairments: pain, reduced posture, SI/IS dysfunction, decreased ROM/flexibility, strength, gait, balance and function. Due to these impairments, the patient is unable to/limited with: sitting too long in one position, laying in bed in one position too long, standing, walking, bending/twisting for the back, stairs, in/out of car, rising, sleeping, house work, yard work, and driving. The patient would benefit from skilled PT services to address functional limitations and impairments and to improve patient quality of life.      Barriers to therapy: anemia, anxiety, arthritis, cerv disc disorder, depression, fibromyalgia, GERD, headache, IBS, kidney stone, obesity, plantar fasciitis, TIA and vertigo could affect PT Rx/progress/outcomes if exacerbated/unregulated which could affect tolerance to PT/exs or delay healing  Prognosis: good    Goals  Plan Goals: STGs in 4 weeks:  Decrease pain to 5/10 on average  Increase LE  ROM by 5-10 degrees where limited as much  Increase LE strength to 4/5  Improve pelvic/sacral alignment prn    LTGs by discharge  Increase trunk/LE ROM to WFL/WNL  Increase LE strength to 5/5   Pt will be able to ascend/descend stairs reciprocally with or without use of rail(s) and with minimal difficulty or pain  Pt will be able to sit/drive/ride for 30-60 mins without difficulty or pain  Pt will be able to stand 30-60 mins for basic ADLs/house work/yard work without difficulty or pain  Pt will be able to walk 30-60 mins for grocery shopping, house work/yard work without difficulty, pain or LOB  Pt will be able to wash/dress/groom without difficulty or increased pain  Pt will be able to sleep full nights most nights without waking from LBP/LE symptoms      Plan  Therapy options: will be seen for skilled therapy services  Planned modality interventions: cryotherapy, thermotherapy (hydrocollator packs), electrical stimulation/Russian stimulation, traction, ultrasound and TENS  Planned therapy interventions: manual therapy, neuromuscular re-education, postural training, soft tissue mobilization, spinal/joint mobilization, strengthening, stretching, therapeutic activities, transfer training, abdominal trunk stabilization, ADL retraining, body mechanics training, home exercise program, gait training, functional ROM exercises, flexibility, motor coordination training, balance/weight-bearing training and joint mobilization  Frequency: 2x week  Duration in weeks: 13  Treatment plan discussed with: patient        History # of Personal Factors and/or Comorbidities: HIGH (3+)  Examination of Body System(s): # of elements: HIGH (4+)  Clinical Presentation: EVOLVING multi comorbidity, variable pain, mod/severe pain, limited balance noted during eval  Clinical Decision Making: MODERATE      Timed:         Manual Therapy:         mins  29277;     Therapeutic Exercise:   10      mins  04302;     Neuromuscular Julia:        mins   62453;    Therapeutic Activity:          mins  03173;     Gait Training:           mins  02163;     Ultrasound:          mins  85933;    Ionto                                   mins   56206  Self Care                            mins   42039      Un-Timed:  Electrical Stimulation:         mins  93028 ( );  Canalith Repos                   mins  36474  Dry Needle 1-2 ms      ___  mins 40357  Dry Needle  3+ ms              mins 70455  Traction          mins 80536  Low Eval          Mins  17163  Mod Eval     33     Mins  82936  High Eval                            Mins  69005  Re-Eval                               mins  35808        Timed Treatment:  10    mins   Total Treatment:    43    mins            PT SIGNATURE: Lucy Boggs, PT   [place] Lic# [id number]  DATE TREATMENT INITIATED: 10/14/2024    Initial Certification  Certification Period: 10/14/2024 through 1/11/2025  I certify that the therapy services are furnished while this patient is under my care.  The services outlined above are required by this patient, and will be reviewed every 90 days.         Physician Signature: _________________________  PHYSICIAN: Gold Arambula MD   NPI: [id number]                                             DATE: _____________________________________    Please sign and return via fax to [phone]. Thank you, [facility].

## 2024-11-04 ENCOUNTER — TELEPHONE (OUTPATIENT)
Dept: PHYSICAL THERAPY | Facility: CLINIC | Age: 49
End: 2024-11-04

## 2024-11-07 ENCOUNTER — TELEPHONE (OUTPATIENT)
Dept: PHYSICAL THERAPY | Facility: CLINIC | Age: 49
End: 2024-11-07

## 2024-11-11 ENCOUNTER — TELEPHONE (OUTPATIENT)
Dept: PHYSICAL THERAPY | Facility: CLINIC | Age: 49
End: 2024-11-11

## 2024-11-11 NOTE — TELEPHONE ENCOUNTER
Caller: Camila Srivastava    Relationship: Self         What was the call regarding: STOPPING PT RIGHT NOW HAS TO TAKE CARE OF HER DAUGHTER    ”

## 2024-12-06 RX ORDER — BUPROPION HYDROCHLORIDE 150 MG/1
TABLET ORAL
Qty: 90 TABLET | Refills: 0 | Status: SHIPPED | OUTPATIENT
Start: 2024-12-06

## 2024-12-11 RX ORDER — CYCLOBENZAPRINE HCL 10 MG
10 TABLET ORAL 3 TIMES DAILY PRN
Qty: 30 TABLET | Refills: 0 | Status: SHIPPED | OUTPATIENT
Start: 2024-12-11

## 2025-01-03 ENCOUNTER — OFFICE VISIT (OUTPATIENT)
Dept: FAMILY MEDICINE CLINIC | Facility: CLINIC | Age: 50
End: 2025-01-03
Payer: COMMERCIAL

## 2025-01-03 VITALS
HEART RATE: 77 BPM | DIASTOLIC BLOOD PRESSURE: 76 MMHG | RESPIRATION RATE: 18 BRPM | BODY MASS INDEX: 32.99 KG/M2 | WEIGHT: 198 LBS | OXYGEN SATURATION: 99 % | HEIGHT: 65 IN | SYSTOLIC BLOOD PRESSURE: 124 MMHG

## 2025-01-03 DIAGNOSIS — Z12.31 ENCOUNTER FOR SCREENING MAMMOGRAM FOR MALIGNANT NEOPLASM OF BREAST: ICD-10-CM

## 2025-01-03 DIAGNOSIS — Z00.00 PREVENTATIVE HEALTH CARE: Primary | ICD-10-CM

## 2025-01-03 DIAGNOSIS — E07.9 THYROID MASS: ICD-10-CM

## 2025-01-03 DIAGNOSIS — Z13.0 SCREENING FOR DEFICIENCY ANEMIA: ICD-10-CM

## 2025-01-03 DIAGNOSIS — K21.9 GERD WITHOUT ESOPHAGITIS: Chronic | ICD-10-CM

## 2025-01-03 DIAGNOSIS — N62 LARGE BREASTS: ICD-10-CM

## 2025-01-03 DIAGNOSIS — I10 PRIMARY HYPERTENSION: ICD-10-CM

## 2025-01-03 DIAGNOSIS — Z13.220 LIPID SCREENING: ICD-10-CM

## 2025-01-03 DIAGNOSIS — E66.01 OBESITY, CLASS III, BMI 40-49.9 (MORBID OBESITY): Chronic | ICD-10-CM

## 2025-01-03 NOTE — PROGRESS NOTES
Chief Complaint  Annual Exam    HPI:    Camila Srivastava presents to [facility]    Patient is a 49-year-old female presenting for annual preventative care visit. Patient overall has been doing well other than the occasional ache and pain.     Hypertension  Home blood pressures typically well controlled off all medications..  Denies headaches, blurry vision, dizziness, chest pain, shortness of breath, or palpitations. Losing weight with semaglutide.     GERD  Currently on Pantoprazole 40 mg daily. Symptoms well controlled with PPI.     Anxiety and depression  Mood overall good on Lexapro 20 mg daily, Wellbutrin 150 mg daily, and BuSpar 15 mg twice daily.    Thyroid nodule  Patient with a history of thyroid nodules with most recent ultrasound 4/24/2024 showing mildly enlarging left TI-RADS 3 nodule measuring up to 3.3 cm.  Radiology recommended continued surveillance versus biopsy.  Patient was referred to ENT for additional considerations. She was not seen by ENT.     Currently on Semaglutide 1.7 mg weekly. She has lost about 50 pounds over the last year on the medication. Denies significant GI side effects other than mild nausea on the first day that resolves with time.     Preventative:    Diet and Exercise: Overall pretty good.     Alcohol, Tobacco, and Recreational Drug use: Rare wine    Cancer screenings:  Colonoscopy: Reportedly completed 5/1/2016.  Due 5/1/2026.  Mammogram: Most recent mammogram 2/20/2023 without evidence of malignancy.  Pap/HPV: Follows with GYN    Immunizations: Due for flu, COVID, tetanus    Advanced Health Care Directive: Not on file      Review of Systems:  ROS negative unless otherwise noted in HPI above.    Past Medical History:   Diagnosis Date    Allergic     Anemia     Anxiety     Arthritis     Cervical disc disorder     cpap     Depression     Fibromyalgia     Fibromyalgia, primary     GERD (gastroesophageal reflux disease)     Headache     HL  (hearing loss)     IBS (irritable bowel syndrome)     Irritable bowel syndrome     Kidney stone     Obesity     Plantar fasciitis     Shingles     hx    Stroke     TIA (transient ischemic attack)     Vertigo     Vitamin B12 deficiency     Vitamin D deficiency          Current Outpatient Medications:     albuterol sulfate  (90 Base) MCG/ACT inhaler, , Disp: , Rfl:     buPROPion XL (WELLBUTRIN XL) 150 MG 24 hr tablet, TAKE 2 TABLETS BY MOUTH ONCE DAILY IN THE MORNING, Disp: 90 tablet, Rfl: 0    busPIRone (BUSPAR) 15 MG tablet, Take 1 tablet by mouth 2 (Two) Times a Day., Disp: 180 tablet, Rfl: 1    Cholecalciferol (VITAMIN D) 2000 units tablet, Take 1 tablet by mouth Daily., Disp: , Rfl:     cyclobenzaprine (FLEXERIL) 10 MG tablet, Take 1 tablet by mouth three times daily as needed for muscle spasm, Disp: 30 tablet, Rfl: 0    diclofenac (VOLTAREN) 75 MG EC tablet, TAKE 1 TABLET BY MOUTH TWICE DAILY WITH MEALS, Disp: 180 tablet, Rfl: 0    dicyclomine (BENTYL) 20 MG tablet, Take 1 tablet by mouth Every 8 (Eight) Hours As Needed for Abdominal Cramping., Disp: 21 tablet, Rfl: 0    escitalopram (LEXAPRO) 20 MG tablet, Take 1 tablet by mouth Daily., Disp: 90 tablet, Rfl: 3    ferrous sulfate 325 (65 FE) MG tablet, Take 1 tablet by mouth 1 (One) Time Per Week., Disp: , Rfl:     ibuprofen (ADVIL,MOTRIN) 800 MG tablet, , Disp: , Rfl:     multivitamin with minerals tablet tablet, Take 1 tablet by mouth Daily., Disp: , Rfl:     ondansetron ODT (ZOFRAN-ODT) 8 MG disintegrating tablet, Place 1 tablet on the tongue As Needed., Disp: , Rfl:     pantoprazole (PROTONIX) 40 MG EC tablet, Take 1 tablet by mouth every morning, Disp: 30 tablet, Rfl: 11    pseudoephedrine (Sudafed 12 Hour) 120 MG 12 hr tablet, Take 1 tablet by mouth Every 12 (Twelve) Hours. (Patient taking differently: Take 1 tablet by mouth As Needed.), Disp: 20 tablet, Rfl: 0    Semaglutide-Weight Management (Wegovy) 1.7 MG/0.75ML solution auto-injector, Inject  "0.75 mL under the skin into the appropriate area as directed 1 (One) Time Per Week., Disp: 0.75 mL, Rfl: 3    SM ALLERGY RELIEF 50 MCG/ACT nasal spray, Administer 1 spray into the nostril(s) as directed by provider Daily As Needed., Disp: , Rfl: 0    Current Facility-Administered Medications:     cyanocobalamin injection 1,000 mcg, 1,000 mcg, Intramuscular, Q28 Days, Khadra Segura MD    Social History     Socioeconomic History    Marital status:    Tobacco Use    Smoking status: Former     Current packs/day: 0.00     Average packs/day: 1 pack/day for 20.0 years (20.0 ttl pk-yrs)     Types: Cigarettes     Start date: 1/1/1995     Quit date: 1/1/2015     Years since quitting: 10.0    Smokeless tobacco: Never   Vaping Use    Vaping status: Never Used   Substance and Sexual Activity    Alcohol use: Yes     Alcohol/week: 1.0 standard drink of alcohol     Types: 1 Drinks containing 0.5 oz of alcohol per week     Comment: rare    Drug use: Never    Sexual activity: Yes     Partners: Male     Birth control/protection: None, Hysterectomy        Objective   Vital Signs:  /76   Pulse 77   Resp 18   Ht 165.1 cm (65\")   Wt 89.8 kg (198 lb)   SpO2 99%   BMI 32.95 kg/m²   Estimated body mass index is 32.95 kg/m² as calculated from the following:    Height as of this encounter: 165.1 cm (65\").    Weight as of this encounter: 89.8 kg (198 lb).    Physical Exam:  General: Well-appearing patient, no apparent distress  HEENT: No posterior pharynx erythema, no tonsillar erythema or exudates, normal external auditory canals, TM normal without bulging or erythema  Cardiac: Regular rate and rhythm, normal S1/S2, no murmur, rubs or gallops, no lower extremity edema  Lungs: Clear to auscultation bilaterally, no crackles or wheezes  Abdomen: Soft, non-tender, no guarding or rebound tenderness, no hepatosplenomegaly  Skin: No significant rashes or lesions  MSK: Grossly normal tone and strength  Neuro: Alert and " oriented x3, CN II-XII grossly intact  Psych: Appropriate mood and affect    Assessment and Plan:    (Z00.00) Preventative health care  Patient is a 49 year old who is overall doing well. Reviewed social and family history. Encouraged increased healthy diet and exercise and discussed importance to overall health.  Up-to-date with age and gender appropriate cancer screenings.  Discussed indicated vaccines based on age and comorbidities. No skin, mood concerns.  Plan:  - Encourage healthy diet and exercise  - Up date vaccines, if necessary  - Screening labs as ordered  - Update cancer screening as below  - Encouraged future advanced health care directive     (E07.9) Thyroid nodule  Assessment: Thyroid nodule noted on most recent thyroid ultrasound for which continued surveillance versus biopsy recommended.  Patient referred to ENT and yet to follow-up due to family issues.  Patient planning on following up with ENT in the near future.  Plan:  - TSH Rfx On Abnormal To Free T4  - Follow-up with ENT as scheduled    (I10) Primary hypertension -   Assessment: Blood pressure well controlled off all medications with significant weight loss.  Discussed importance of healthy diet and exercise.  Plan:  - BMP, CBC  - Continue current medications as prescribed  - Intermittently monitor home blood pressures and follow up if elevated  - Encourage healthy diet and exercise    (K21.9) GERD without esophagitis  Assessment: Patient overall doing well on pantoprazole 40 mg daily.  No new/worsening symptoms or red flag symptoms to warrant endoscopy.  Plan:  - Continue pantoprazole as prescribed  - Avoid potential reflux triggers    (E66.01) Obesity  Assessment: BMI 32.95.  Patient down over 50 pounds in 1 year with use of semaglutide.  Tolerating medication well without side effects.  Patient able to lose weight and maintain due to medication and improvement in diet and activity.  Plan:   - Continue semaglutide as prescribed  - Encourage  continued healthy diet and activity    (N62) Large breasts -   Assessment: Patient with significant discomfort and issues with posture related to large breast after significant weight loss.  Patient would like to follow-up with specialist for consideration of breast reduction surgery.  Plan:   - Ambulatory Referral to Plastic Surgery    (Z12.31) Encounter for screening mammogram for malignant neoplasm of breast - Plan: Mammo Screening Digital Tomosynthesis Bilateral With CAD    (Z13.220) Lipid screening - Plan: Lipid panel    (Z13.0) Screening for deficiency anemia  - CBC      Patient was given instructions and counseling regarding her condition or for health maintenance advice. Please see specific information pulled into the AVS if appropriate.       Dr Gold Arambula   Internal Medicine Physician  [facility]  [address], [address]  [place] [address]

## 2025-01-03 NOTE — PATIENT INSTRUCTIONS
Please schedule fasting lab only appointment    Check on tetanus; can get at local pharmacy if due    Medications:  Continue current medications as prescribed    Follow up with ENT    Encourage healthy diet and exercise    Follow up in one year or sooner if something arises

## 2025-01-08 ENCOUNTER — LAB (OUTPATIENT)
Dept: FAMILY MEDICINE CLINIC | Facility: CLINIC | Age: 50
End: 2025-01-08
Payer: COMMERCIAL

## 2025-01-08 ENCOUNTER — PATIENT ROUNDING (BHMG ONLY) (OUTPATIENT)
Dept: FAMILY MEDICINE CLINIC | Facility: CLINIC | Age: 50
End: 2025-01-08
Payer: COMMERCIAL

## 2025-01-08 ENCOUNTER — DOCUMENTATION (OUTPATIENT)
Dept: PHYSICAL THERAPY | Facility: CLINIC | Age: 50
End: 2025-01-08
Payer: COMMERCIAL

## 2025-01-08 DIAGNOSIS — I10 PRIMARY HYPERTENSION: ICD-10-CM

## 2025-01-08 DIAGNOSIS — E07.9 THYROID MASS: ICD-10-CM

## 2025-01-08 DIAGNOSIS — Z13.220 LIPID SCREENING: ICD-10-CM

## 2025-01-08 LAB
ANION GAP SERPL CALCULATED.3IONS-SCNC: 9.4 MMOL/L (ref 5–15)
BASOPHILS # BLD AUTO: 0.04 10*3/MM3 (ref 0–0.2)
BASOPHILS NFR BLD AUTO: 0.7 % (ref 0–1.5)
BUN SERPL-MCNC: 13 MG/DL (ref 6–20)
BUN/CREAT SERPL: 13.4 (ref 7–25)
CALCIUM SPEC-SCNC: 9.2 MG/DL (ref 8.6–10.5)
CHLORIDE SERPL-SCNC: 104 MMOL/L (ref 98–107)
CHOLEST SERPL-MCNC: 204 MG/DL (ref 0–200)
CO2 SERPL-SCNC: 27.6 MMOL/L (ref 22–29)
CREAT SERPL-MCNC: 0.97 MG/DL (ref 0.57–1)
DEPRECATED RDW RBC AUTO: 36.8 FL (ref 37–54)
EGFRCR SERPLBLD CKD-EPI 2021: 71.8 ML/MIN/1.73
EOSINOPHIL # BLD AUTO: 0.11 10*3/MM3 (ref 0–0.4)
EOSINOPHIL NFR BLD AUTO: 1.9 % (ref 0.3–6.2)
ERYTHROCYTE [DISTWIDTH] IN BLOOD BY AUTOMATED COUNT: 12.2 % (ref 12.3–15.4)
GLUCOSE SERPL-MCNC: 82 MG/DL (ref 65–99)
HCT VFR BLD AUTO: 43 % (ref 34–46.6)
HDLC SERPL-MCNC: 56 MG/DL (ref 40–60)
HGB BLD-MCNC: 14.1 G/DL (ref 12–15.9)
IMM GRANULOCYTES # BLD AUTO: 0.01 10*3/MM3 (ref 0–0.05)
IMM GRANULOCYTES NFR BLD AUTO: 0.2 % (ref 0–0.5)
LDLC SERPL CALC-MCNC: 127 MG/DL (ref 0–100)
LDLC/HDLC SERPL: 2.22 {RATIO}
LYMPHOCYTES # BLD AUTO: 1.32 10*3/MM3 (ref 0.7–3.1)
LYMPHOCYTES NFR BLD AUTO: 22.8 % (ref 19.6–45.3)
MCH RBC QN AUTO: 27.8 PG (ref 26.6–33)
MCHC RBC AUTO-ENTMCNC: 32.8 G/DL (ref 31.5–35.7)
MCV RBC AUTO: 84.6 FL (ref 79–97)
MONOCYTES # BLD AUTO: 0.39 10*3/MM3 (ref 0.1–0.9)
MONOCYTES NFR BLD AUTO: 6.7 % (ref 5–12)
NEUTROPHILS NFR BLD AUTO: 3.92 10*3/MM3 (ref 1.7–7)
NEUTROPHILS NFR BLD AUTO: 67.7 % (ref 42.7–76)
NRBC BLD AUTO-RTO: 0 /100 WBC (ref 0–0.2)
PLATELET # BLD AUTO: 170 10*3/MM3 (ref 140–450)
PMV BLD AUTO: 12 FL (ref 6–12)
POTASSIUM SERPL-SCNC: 4.1 MMOL/L (ref 3.5–5.2)
RBC # BLD AUTO: 5.08 10*6/MM3 (ref 3.77–5.28)
SODIUM SERPL-SCNC: 141 MMOL/L (ref 136–145)
TRIGL SERPL-MCNC: 119 MG/DL (ref 0–150)
TSH SERPL DL<=0.05 MIU/L-ACNC: 1.23 UIU/ML (ref 0.27–4.2)
VLDLC SERPL-MCNC: 21 MG/DL (ref 5–40)
WBC NRBC COR # BLD AUTO: 5.79 10*3/MM3 (ref 3.4–10.8)

## 2025-01-08 PROCEDURE — 80048 BASIC METABOLIC PNL TOTAL CA: CPT | Performed by: INTERNAL MEDICINE

## 2025-01-08 PROCEDURE — 85025 COMPLETE CBC W/AUTO DIFF WBC: CPT | Performed by: INTERNAL MEDICINE

## 2025-01-08 PROCEDURE — 36415 COLL VENOUS BLD VENIPUNCTURE: CPT

## 2025-01-08 PROCEDURE — 80061 LIPID PANEL: CPT | Performed by: INTERNAL MEDICINE

## 2025-01-08 PROCEDURE — 84443 ASSAY THYROID STIM HORMONE: CPT | Performed by: INTERNAL MEDICINE

## 2025-01-08 NOTE — PROGRESS NOTES
Discharge Summary  Discharge Summary from Physical Therapy Report  [address] [place] [address]        Dates  PT visit: 10/14/24  Number of Visits: 1     Discharge Status: Pt never returned to PT after the eval as pt was ill, then she noted she had to take care of her daughter and never returned to PT.    Goals: Undetermined as pt never returned to PT after the eval.    Discharge Plan: No specific D/C instructions were given as pt never returned to PT after the eval.     Comments: Pt was given HEP during sessions.     Date of Discharge 1/8/25        Lucy Boggs, PT  Physical Therapist

## 2025-01-08 NOTE — PROGRESS NOTES
January 8, 2025      A [employer] message was sent to Camila Srivastava inquiring about patient's experience at our office during a recent visit.      MEGHNA KNOWLES  [facility]  [address]  [place] [address].

## 2025-01-09 ENCOUNTER — OFFICE VISIT (OUTPATIENT)
Dept: FAMILY MEDICINE CLINIC | Facility: CLINIC | Age: 50
End: 2025-01-09
Payer: COMMERCIAL

## 2025-01-09 VITALS
WEIGHT: 191.8 LBS | RESPIRATION RATE: 18 BRPM | SYSTOLIC BLOOD PRESSURE: 122 MMHG | HEIGHT: 65 IN | BODY MASS INDEX: 31.96 KG/M2 | HEART RATE: 87 BPM | OXYGEN SATURATION: 96 % | DIASTOLIC BLOOD PRESSURE: 86 MMHG

## 2025-01-09 DIAGNOSIS — Z56.89: Primary | ICD-10-CM

## 2025-01-09 NOTE — PROGRESS NOTES
Chief Complaint  Discuss FMLA Forms    HPI:    Camila Srivastava presents to [facility]    Patient is a 49-year-old female presenting to have FMLA paperwork completed to care for her father.    Patient requesting paperwork to be completed for FMLA leave to help care for her father, who has metastatic pancreatic cancer and is declining despite treatments.  Follows with oncology.  Patient's father needs assistance with medications, vitals, routine cares and ADLs, and assistance with going to and from doctors appointments and treatment.  Unclear how long leave will be required as patient's father due to follow-up with oncology to determine next treatment step and prognosis.    Review of Systems:  ROS negative unless otherwise noted in HPI above.    Past Medical History:   Diagnosis Date    Allergic     Anemia     Anxiety     Arthritis     Cervical disc disorder     cpap     Depression     Fibromyalgia     Fibromyalgia, primary     GERD (gastroesophageal reflux disease)     Headache     HL (hearing loss)     IBS (irritable bowel syndrome)     Irritable bowel syndrome     Kidney stone     Obesity     Plantar fasciitis     Shingles     hx    Stroke     TIA (transient ischemic attack)     Vertigo     Vitamin B12 deficiency     Vitamin D deficiency          Current Outpatient Medications:     albuterol sulfate  (90 Base) MCG/ACT inhaler, , Disp: , Rfl:     buPROPion XL (WELLBUTRIN XL) 150 MG 24 hr tablet, TAKE 2 TABLETS BY MOUTH ONCE DAILY IN THE MORNING, Disp: 90 tablet, Rfl: 0    busPIRone (BUSPAR) 15 MG tablet, Take 1 tablet by mouth 2 (Two) Times a Day., Disp: 180 tablet, Rfl: 1    Cholecalciferol (VITAMIN D) 2000 units tablet, Take 1 tablet by mouth Daily., Disp: , Rfl:     cyclobenzaprine (FLEXERIL) 10 MG tablet, Take 1 tablet by mouth three times daily as needed for muscle spasm, Disp: 30 tablet, Rfl: 0    diclofenac (VOLTAREN) 75 MG EC tablet, TAKE 1 TABLET BY MOUTH TWICE DAILY  WITH MEALS, Disp: 180 tablet, Rfl: 0    dicyclomine (BENTYL) 20 MG tablet, Take 1 tablet by mouth Every 8 (Eight) Hours As Needed for Abdominal Cramping., Disp: 21 tablet, Rfl: 0    escitalopram (LEXAPRO) 20 MG tablet, Take 1 tablet by mouth Daily., Disp: 90 tablet, Rfl: 3    ferrous sulfate 325 (65 FE) MG tablet, Take 1 tablet by mouth 1 (One) Time Per Week., Disp: , Rfl:     ibuprofen (ADVIL,MOTRIN) 800 MG tablet, , Disp: , Rfl:     multivitamin with minerals tablet tablet, Take 1 tablet by mouth Daily., Disp: , Rfl:     ondansetron ODT (ZOFRAN-ODT) 8 MG disintegrating tablet, Place 1 tablet on the tongue As Needed., Disp: , Rfl:     pantoprazole (PROTONIX) 40 MG EC tablet, Take 1 tablet by mouth every morning, Disp: 30 tablet, Rfl: 11    pseudoephedrine (Sudafed 12 Hour) 120 MG 12 hr tablet, Take 1 tablet by mouth Every 12 (Twelve) Hours. (Patient taking differently: Take 1 tablet by mouth As Needed.), Disp: 20 tablet, Rfl: 0    Semaglutide-Weight Management (Wegovy) 1.7 MG/0.75ML solution auto-injector, Inject 0.75 mL under the skin into the appropriate area as directed 1 (One) Time Per Week., Disp: 0.75 mL, Rfl: 3    SM ALLERGY RELIEF 50 MCG/ACT nasal spray, Administer 1 spray into the nostril(s) as directed by provider Daily As Needed., Disp: , Rfl: 0    Current Facility-Administered Medications:     cyanocobalamin injection 1,000 mcg, 1,000 mcg, Intramuscular, Q28 Days, Khadra Segura MD    Social History     Socioeconomic History    Marital status:    Tobacco Use    Smoking status: Former     Current packs/day: 0.00     Average packs/day: 1 pack/day for 20.0 years (20.0 ttl pk-yrs)     Types: Cigarettes     Start date: 1/1/1995     Quit date: 1/1/2015     Years since quitting: 10.0    Smokeless tobacco: Never   Vaping Use    Vaping status: Never Used   Substance and Sexual Activity    Alcohol use: Yes     Alcohol/week: 1.0 standard drink of alcohol     Types: 1 Drinks containing 0.5 oz of  "alcohol per week     Comment: rare    Drug use: Never    Sexual activity: Yes     Partners: Male     Birth control/protection: None, Hysterectomy        Objective   Vital Signs:  /86   Pulse 87   Resp 18   Ht 165.1 cm (65\")   Wt 87 kg (191 lb 12.8 oz)   SpO2 96%   BMI 31.92 kg/m²   Estimated body mass index is 31.92 kg/m² as calculated from the following:    Height as of this encounter: 165.1 cm (65\").    Weight as of this encounter: 87 kg (191 lb 12.8 oz).    Physical Exam:  General: Well-appearing patient, no apparent distress  Skin: No visible rashes or lesions  MSK: Grossly normal tone and strength  Neuro: Alert and oriented x3, CN II-XII grossly intact  Psych: Appropriate mood and affect    Assessment and Plan:    (Z56.89) Requesting FMLA leave  Assessment: Patient requesting FMLA leave to help care for her terminally ill father with paperwork completed.  Patient's father requires assistance with multiple medical cares and activities of daily living that can be provided by patient.  Exact duration of leave uncertain as is based on longevity of father, although prognosis overall guarded to poor.  Plan:  - FMLA paperwork completed  - FMLA paperwork to be faxed to desired location and scanned into patient and patient's father's chart    Over 30 minutes spent reviewing chart, completing paperwork, and coordinating care.    Patient was given instructions and counseling regarding her condition or for health maintenance advice. Please see specific information pulled into the AVS if appropriate.       Dr Gold Arambula   Internal Medicine Physician  [facility]  [address], [address]  [place] [address]   "

## 2025-01-10 RX ORDER — BUPROPION HYDROCHLORIDE 150 MG/1
150 TABLET ORAL EVERY MORNING
Qty: 90 TABLET | Refills: 0 | Status: SHIPPED | OUTPATIENT
Start: 2025-01-10

## 2025-01-17 ENCOUNTER — HOSPITAL ENCOUNTER (OUTPATIENT)
Dept: MAMMOGRAPHY | Facility: HOSPITAL | Age: 50
Discharge: HOME | End: 2025-01-17
Admitting: INTERNAL MEDICINE
Payer: COMMERCIAL

## 2025-01-17 ENCOUNTER — TELEPHONE (OUTPATIENT)
Dept: FAMILY MEDICINE CLINIC | Facility: CLINIC | Age: 50
End: 2025-01-17
Payer: COMMERCIAL

## 2025-01-17 DIAGNOSIS — Z12.31 ENCOUNTER FOR SCREENING MAMMOGRAM FOR MALIGNANT NEOPLASM OF BREAST: ICD-10-CM

## 2025-01-17 PROCEDURE — 77063 BREAST TOMOSYNTHESIS BI: CPT

## 2025-01-17 PROCEDURE — 77067 SCR MAMMO BI INCL CAD: CPT

## 2025-01-17 NOTE — TELEPHONE ENCOUNTER
PATIENT CALLED IN REGARDS TO FMLA PAPERWORK . THEY ARE NEEDING MORE INFORMATION  PART B QUESTION 3 ESTIMATED FREQUENCY AND DURATION FOR TREATMENT AND APPOINTMENT IS REQUIRED, NEEDS TO BE FILLED OUT.    PART B QUESTION 5 > ESTIMATED HOURS OF CARE NEEDED ON AN INTERMITTENT BASIS> HOUR A DAY, DAYS A WEEK.      PROVIDER SIGNS AND  DATES ANY CHANGES MADE TO PAPERWORK    MEGHAN NEEDS TO HAVE THIS BACK BY 1/30/25    CALL BACK  NUMBER [phone]    FAX NUMBER [phone]

## 2025-01-25 RX ORDER — BUPROPION HYDROCHLORIDE 150 MG/1
150 TABLET ORAL EVERY MORNING
Qty: 90 TABLET | Refills: 0 | Status: CANCELLED | OUTPATIENT
Start: 2025-01-25

## 2025-01-27 RX ORDER — BUPROPION HYDROCHLORIDE 150 MG/1
150 TABLET ORAL 2 TIMES DAILY
Qty: 60 TABLET | Refills: 3 | Status: SHIPPED | OUTPATIENT
Start: 2025-01-27

## 2025-01-31 ENCOUNTER — OFFICE (AMBULATORY)
Dept: URBAN - METROPOLITAN AREA CLINIC 64 | Facility: CLINIC | Age: 50
End: 2025-01-31
Payer: COMMERCIAL

## 2025-01-31 VITALS
HEART RATE: 76 BPM | DIASTOLIC BLOOD PRESSURE: 75 MMHG | SYSTOLIC BLOOD PRESSURE: 136 MMHG | WEIGHT: 188 LBS | HEIGHT: 65 IN

## 2025-01-31 DIAGNOSIS — K31.84 GASTROPARESIS: ICD-10-CM

## 2025-01-31 DIAGNOSIS — K58.2 MIXED IRRITABLE BOWEL SYNDROME: ICD-10-CM

## 2025-01-31 DIAGNOSIS — K21.9 GASTRO-ESOPHAGEAL REFLUX DISEASE WITHOUT ESOPHAGITIS: ICD-10-CM

## 2025-01-31 DIAGNOSIS — R13.10 DYSPHAGIA, UNSPECIFIED: ICD-10-CM

## 2025-01-31 PROCEDURE — 99213 OFFICE O/P EST LOW 20 MIN: CPT

## 2025-01-31 RX ORDER — FAMOTIDINE 40 MG/1
40 TABLET, FILM COATED ORAL
Qty: 30 | Refills: 11 | Status: ACTIVE
Start: 2025-01-31

## 2025-02-13 RX ORDER — SEMAGLUTIDE 1.7 MG/.75ML
INJECTION, SOLUTION SUBCUTANEOUS
Qty: 4 ML | Refills: 0 | Status: SHIPPED | OUTPATIENT
Start: 2025-02-13

## 2025-02-28 ENCOUNTER — PATIENT MESSAGE (OUTPATIENT)
Dept: FAMILY MEDICINE CLINIC | Facility: CLINIC | Age: 50
End: 2025-02-28
Payer: COMMERCIAL

## 2025-03-04 RX ORDER — DICLOFENAC SODIUM 75 MG/1
75 TABLET, DELAYED RELEASE ORAL 2 TIMES DAILY WITH MEALS
Qty: 180 TABLET | Refills: 0 | Status: SHIPPED | OUTPATIENT
Start: 2025-03-04

## 2025-03-12 ENCOUNTER — HOSPITAL ENCOUNTER (OUTPATIENT)
Facility: HOSPITAL | Age: 50
Setting detail: HOSPITAL OUTPATIENT SURGERY
Discharge: HOME | End: 2025-03-12
Attending: INTERNAL MEDICINE | Admitting: INTERNAL MEDICINE
Payer: COMMERCIAL

## 2025-03-12 ENCOUNTER — ANESTHESIA (OUTPATIENT)
Dept: GASTROENTEROLOGY | Facility: HOSPITAL | Age: 50
End: 2025-03-12
Payer: COMMERCIAL

## 2025-03-12 ENCOUNTER — ANESTHESIA EVENT (OUTPATIENT)
Dept: GASTROENTEROLOGY | Facility: HOSPITAL | Age: 50
End: 2025-03-12
Payer: COMMERCIAL

## 2025-03-12 VITALS
SYSTOLIC BLOOD PRESSURE: 111 MMHG | DIASTOLIC BLOOD PRESSURE: 71 MMHG | HEART RATE: 74 BPM | WEIGHT: 187.4 LBS | BODY MASS INDEX: 31.22 KG/M2 | HEIGHT: 65 IN | RESPIRATION RATE: 15 BRPM | OXYGEN SATURATION: 100 % | TEMPERATURE: 98.4 F

## 2025-03-12 DIAGNOSIS — R13.10 DYSPHAGIA: ICD-10-CM

## 2025-03-12 DIAGNOSIS — K21.9 ACID REFLUX: ICD-10-CM

## 2025-03-12 PROCEDURE — 25010000002 LIDOCAINE PF 2% 2 % SOLUTION

## 2025-03-12 PROCEDURE — 25010000002 PROPOFOL 10 MG/ML EMULSION

## 2025-03-12 PROCEDURE — 88305 TISSUE EXAM BY PATHOLOGIST: CPT | Performed by: INTERNAL MEDICINE

## 2025-03-12 PROCEDURE — 25810000003 SODIUM CHLORIDE 0.9 % SOLUTION

## 2025-03-12 RX ORDER — LIDOCAINE HYDROCHLORIDE 20 MG/ML
INJECTION, SOLUTION EPIDURAL; INFILTRATION; INTRACAUDAL; PERINEURAL AS NEEDED
Status: DISCONTINUED | OUTPATIENT
Start: 2025-03-12 | End: 2025-03-12 | Stop reason: SURG

## 2025-03-12 RX ORDER — PROPOFOL 10 MG/ML
VIAL (ML) INTRAVENOUS AS NEEDED
Status: DISCONTINUED | OUTPATIENT
Start: 2025-03-12 | End: 2025-03-12 | Stop reason: SURG

## 2025-03-12 RX ORDER — SODIUM CHLORIDE 9 MG/ML
INJECTION, SOLUTION INTRAVENOUS CONTINUOUS PRN
Status: DISCONTINUED | OUTPATIENT
Start: 2025-03-12 | End: 2025-03-12 | Stop reason: SURG

## 2025-03-12 RX ADMIN — PROPOFOL 50 MG: 10 INJECTION, EMULSION INTRAVENOUS at 09:48

## 2025-03-12 RX ADMIN — PROPOFOL 50 MG: 10 INJECTION, EMULSION INTRAVENOUS at 09:45

## 2025-03-12 RX ADMIN — PROPOFOL 50 MG: 10 INJECTION, EMULSION INTRAVENOUS at 09:47

## 2025-03-12 RX ADMIN — PROPOFOL 100 MG: 10 INJECTION, EMULSION INTRAVENOUS at 09:44

## 2025-03-12 RX ADMIN — SODIUM CHLORIDE: 9 INJECTION, SOLUTION INTRAVENOUS at 09:37

## 2025-03-12 RX ADMIN — LIDOCAINE HYDROCHLORIDE 100 MG: 20 INJECTION, SOLUTION EPIDURAL; INFILTRATION; INTRACAUDAL; PERINEURAL at 09:44

## 2025-03-12 NOTE — DISCHARGE INSTRUCTIONS
A responsible adult should stay with you and you should rest quietly for the rest of the day.    Do not drink alcohol, drive, operate any heavy machinery or power tools or make any legal/important decisions for the next 24 hours.     Progress your diet as tolerated.  If you begin to experience severe pain, increased shortness of breath, racing heartbeat or a fever above 101 F, seek immediate medical attention.     Follow up with MD as instructed. Call office for results in 3 to 5 days if needed.     [phone]  EGD Findings:  1.  Normal mucosa of the whole esophagus.  Given her complaint of dysphagia empiric dilation was performed with 56 [place] nonguided bougie with minimal resistance and post endoscopy showed appropriate mucosal splitting  2.  Fundic gland polyps in the stomach fundus and body which appeared benign, sessile, between 4 and 8 mm in diameter.  Biopsy was performed with cold forceps for histology  3.  Patchy erythema and granularity in the stomach body consistent with gastritis.  Biopsies obtained with cold forceps from stomach body to rule out H. pylori  4.  Medium size sliding hiatal hernia, 4 cm in length, Hill grade 4  5.  Normal mucosa in the duodenal bulb and second portion of the duodenum     Recommendations:  -Continue PPI daily and Pepcid nightly  -If persistent dysphagia recommend esophageal manometry as an outpatient  -Follow-up in the office as scheduled

## 2025-03-12 NOTE — H&P
GI CONSULT  NOTE:    Referring Provider:    Gold Arambula MD R Tyler Luckett, MD    Chief complaint: <principal problem not specified>    Subjective .       Pre op diagnosis  Dysphagia [R13.10]  Acid reflux [K21.9]      History of present illness:      Camila Srivastava is a 49 y.o. female who presents today for Procedure(s):  ESOPHAGOGASTRODUODENOSCOPY with DILATION for the indications listed below.     The updated Patient Profile was reviewed prior to the procedure, in conjunction with the Physical Exam, including medical conditions, surgical procedures, medications, allergies, family history and social history.     Pre-operatively, I reviewed the indication(s) for the procedure, the risks of the procedure [including but not limited to: unexpected bleeding possibly requiring hospitalization and/or unplanned repeat procedures, perforation possibly requiring surgical treatment, missed lesions and complications of sedation/MAC (also explained by anesthesia staff)].     I have evaluated the patient for risks associated with the planned anesthesia and the procedure to be performed and find the patient an acceptable candidate for IV sedation.    Multiple opportunities were provided for any questions or concerns, and all questions were answered satisfactorily before any anesthesia was administered. We will proceed with the planned procedure.    Past Medical History:  Past Medical History:   Diagnosis Date    Allergic     Anemia     Anxiety     Arthritis     Cervical disc disorder     cpap     Depression     Fibromyalgia     Fibromyalgia, primary     GERD (gastroesophageal reflux disease)     Headache     HL (hearing loss)     IBS (irritable bowel syndrome)     Irritable bowel syndrome     Kidney stone     Obesity     Plantar fasciitis     Shingles     hx    Stroke     TIA (transient ischemic attack)     Vertigo     Vitamin B12 deficiency     Vitamin D deficiency        Past Surgical History:  Past Surgical History:    Procedure Laterality Date     SECTION      COLONOSCOPY      ENDOSCOPY N/A 2023    Procedure: ESOPHAGOGASTRODUODENOSCOPY WITH esophageal dilation #50 and #54 bougie;  Surgeon: Bobo Kennedy MD;  Location: [facility] ENDOSCOPY;  Service: Gastroenterology;  Laterality: N/A;  dysphagia    HYSTERECTOMY      US GUIDED FINE NEEDLE ASPIRATION  2021    thyroid    US GUIDED FINE NEEDLE ASPIRATION  2021       Social History:  Social History     Tobacco Use    Smoking status: Former     Current packs/day: 0.00     Average packs/day: 1 pack/day for 20.0 years (20.0 ttl pk-yrs)     Types: Cigarettes     Start date: 1995     Quit date: 2015     Years since quitting: 10.2    Smokeless tobacco: Never   Vaping Use    Vaping status: Never Used   Substance Use Topics    Alcohol use: Yes     Alcohol/week: 1.0 standard drink of alcohol     Types: 1 Drinks containing 0.5 oz of alcohol per week     Comment: rare    Drug use: Never       Family History:  Family History   Problem Relation Age of Onset    Lupus Mother     Liver disease Mother     Rheum arthritis Mother     Arthritis Mother     Osteoporosis Mother     Coronary artery disease Father     Skin cancer Sister     Thyroid disease Sister     Diabetes Maternal Grandmother     Hypertension Maternal Grandmother     Lupus Maternal Grandmother     Anxiety disorder Maternal Grandmother     COPD Maternal Grandmother     Depression Maternal Grandmother     Hearing loss Maternal Grandmother     Lupus Maternal Grandfather     Kidney failure Paternal Grandmother     Lupus Maternal Aunt        Medications:  Facility-Administered Medications Prior to Admission   Medication Dose Route Frequency Provider Last Rate Last Admin    cyanocobalamin injection 1,000 mcg  1,000 mcg Intramuscular Q28 Days Khadra Segura MD         Medications Prior to Admission   Medication Sig Dispense Refill Last Dose/Taking    albuterol sulfate  (90 Base) MCG/ACT  inhaler    3/11/2025    buPROPion XL (WELLBUTRIN XL) 150 MG 24 hr tablet Take 1 tablet by mouth 2 (Two) Times a Day. 60 tablet 3 3/11/2025    busPIRone (BUSPAR) 15 MG tablet Take 1 tablet by mouth 2 (Two) Times a Day. 180 tablet 1 3/11/2025    Cholecalciferol (VITAMIN D) 2000 units tablet Take 1 tablet by mouth Daily.   3/11/2025    cyclobenzaprine (FLEXERIL) 10 MG tablet Take 1 tablet by mouth three times daily as needed for muscle spasm 30 tablet 0 3/11/2025    diclofenac (VOLTAREN) 75 MG EC tablet Take 1 tablet by mouth 2 (Two) Times a Day With Meals. 180 tablet 0 Past Week    dicyclomine (BENTYL) 20 MG tablet Take 1 tablet by mouth Every 8 (Eight) Hours As Needed for Abdominal Cramping. 21 tablet 0 3/11/2025    escitalopram (LEXAPRO) 20 MG tablet Take 1 tablet by mouth Daily. 90 tablet 3 3/11/2025    ferrous sulfate 325 (65 FE) MG tablet Take 1 tablet by mouth 1 (One) Time Per Week.   Past Week    ibuprofen (ADVIL,MOTRIN) 800 MG tablet    Past Month    multivitamin with minerals tablet tablet Take 1 tablet by mouth Daily.   3/11/2025    ondansetron ODT (ZOFRAN-ODT) 8 MG disintegrating tablet Place 1 tablet on the tongue As Needed.   Past Week    pantoprazole (PROTONIX) 40 MG EC tablet Take 1 tablet by mouth every morning 30 tablet 11 3/11/2025    pseudoephedrine (Sudafed 12 Hour) 120 MG 12 hr tablet Take 1 tablet by mouth Every 12 (Twelve) Hours. (Patient taking differently: Take 1 tablet by mouth As Needed.) 20 tablet 0 Past Month    SM ALLERGY RELIEF 50 MCG/ACT nasal spray Administer 1 spray into the nostril(s) as directed by provider Daily As Needed.  0 Past Week    Wegovy 1.7 MG/0.75ML solution auto-injector INJECT 1.7MG UNDER THE SKIN INTO THE APPROPRIATE AREA AS DIRECTED 1 TIME PER WEEK 4 mL 0 3/1/2025       Scheduled Meds:  Continuous Infusions:No current facility-administered medications for this encounter.    PRN Meds:.    ALLERGIES:  Penicillin g, Oxycodone, and Penicillins    ROS:  The following  "systems were reviewed and negative;  Constitution:  No fevers, chills, no unintentional weight loss  Skin: no rash, no jaundice  Eyes:  No blurry vision, no eye pain  HENT:  No change in hearing or smell  Resp:  No dyspnea or cough  CV:  No chest pain or palpitations  :  No dysuria, hematuria  Musculoskeletal:  No leg cramps or arthralgias  Neuro:  No tremor, no numbness  Psych:  No depression or confsusion    Objective     Vital Signs:   Vitals:    02/28/25 1340 03/12/25 0902   BP:  117/78   Pulse:  73   Resp:  12   Temp:  98.4 °F (36.9 °C)   SpO2:  96%   Weight: 83.5 kg (184 lb) 85 kg (187 lb 6.4 oz)   Height: 165.1 cm (65\") 165.1 cm (65\")       Physical Exam:       General Appearance:    Awake and alert, in no acute distress   Head:    Normocephalic, without obvious abnormality, atraumatic   Throat:   No oral lesions, no thrush, oral mucosa moist   Lungs:     respirations regular, even and unlabored   Skin:   No rash, no jaundice       Results Review:  Lab Results (last 24 hours)       ** No results found for the last 24 hours. **            Imaging Results (Last 24 Hours)       ** No results found for the last 24 hours. **             I reviewed the patient's labs and imaging.    ASSESSMENT AND PLAN:      Active Problems:    * No active hospital problems. *       Procedure(s):  ESOPHAGOGASTRODUODENOSCOPY with DILATION      I discussed the patient's findings and my recommendations with the patient.    LISA Correa MD  03/12/25  09:45 EDT                "

## 2025-03-12 NOTE — ANESTHESIA POSTPROCEDURE EVALUATION
Patient: Camila Srivastava    Procedure Summary       Date: 03/12/25 Room / Location: [facility] ENDOSCOPY 1 / [facility] ENDOSCOPY    Anesthesia Start: 0937 Anesthesia Stop: 0956    Procedure: ESOPHAGOGASTRODUODENOSCOPY with DILATION (BOUGIE 56) & BIOPSY Diagnosis:       Dysphagia      Acid reflux      (Dysphagia [R13.10])      (Acid reflux [K21.9])    Surgeons: NINI Correa MD Provider: Sarah Aaron MD    Anesthesia Type: general ASA Status: 3            Anesthesia Type: general    Vitals  Vitals Value Taken Time   /71 03/12/25 10:17   Temp     Pulse 74 03/12/25 10:17   Resp 15 03/12/25 10:16   SpO2 99 % 03/12/25 10:17   Vitals shown include unfiled device data.        Post Anesthesia Care and Evaluation    Patient location during evaluation: PACU  Patient participation: complete - patient participated  Level of consciousness: awake and alert  Pain management: satisfactory to patient    Airway patency: patent  Anesthetic complications: No anesthetic complications  PONV Status: none  Cardiovascular status: acceptable  Respiratory status: acceptable  Hydration status: acceptable

## 2025-03-12 NOTE — ANESTHESIA PREPROCEDURE EVALUATION
Anesthesia Evaluation     Patient summary reviewed and Nursing notes reviewed   no history of anesthetic complications:   NPO Solid Status: > 8 hours  NPO Liquid Status: > 8 hours           Airway   Mallampati: II  TM distance: >3 FB  Neck ROM: full  No difficulty expected  Dental - normal exam     Pulmonary - normal exam   (+) asthma,sleep apnea on CPAP    ROS comment: Allergy induced asthma  Cardiovascular - normal exam    ECG reviewed    (+) hypertension      Neuro/Psych  (+) TIA, psychiatric history Depression and Anxiety  GI/Hepatic/Renal/Endo    (+) morbid obesity, GERD (asymptomatic today; states she has more problems at night) poorly controlled    Musculoskeletal     (+) back pain  Abdominal  - normal exam    Bowel sounds: normal.   Substance History      OB/GYN          Other   arthritis,                   Anesthesia Plan    ASA 3     general   total IV anesthesia  intravenous induction     Anesthetic plan, risks, benefits, and alternatives have been provided, discussed and informed consent has been obtained with: patient.    Plan discussed with CRNA.      CODE STATUS:

## 2025-03-12 NOTE — OP NOTE
ESOPHAGOGASTRODUODENOSCOPY Procedure Report    Patient Name:  Camila Srivastava  YOB: 1975    Date of Surgery:  3/12/2025     Pre-Op Diagnosis:  Dysphagia [R13.10]  Acid reflux [K21.9]       Post-Op Diagnosis Codes:     * Dysphagia [R13.10]     * Acid reflux [K21.9]    Post Op Diagnosis:  Hiatal hernia  Fundic gland polyps  Gastritis    Procedure/CPT® Codes:  OK ESOPHAGOGASTRODUODENOSCOPY TRANSORAL DIAGNOSTIC [16970]    Procedure(s):  ESOPHAGOGASTRODUODENOSCOPY with DILATION (BOUGIE 56) & BIOPSY    Staff:  Surgeon(s):  NINI Correa MD      Anesthesia: Monitored Anesthesia Care    Code status:  Discussed code status with patient and they will remain full code    Description of Procedure:  A description of the procedure as well as risks, benefits and alternative methods were explained to the patient who voiced understanding and signed the corresponding consent form. A physical exam was performed and vital signs were monitored throughout the procedure.    An upper GI endoscope was placed into the mouth and proceeded through the esophagus, stomach and second portion of the duodenum without difficulty. The scope was then retroflexed and the fundus was visualized. The procedure was not difficult and there were no immediate complications.  There was no blood loss.    EGD Findings:  1.  Normal mucosa of the whole esophagus.  Given her complaint of dysphagia empiric dilation was performed with 56 [place] nonguided bougie with minimal resistance and post endoscopy showed appropriate mucosal splitting  2.  Fundic gland polyps in the stomach fundus and body which appeared benign, sessile, between 4 and 8 mm in diameter.  Biopsy was performed with cold forceps for histology  3.  Patchy erythema and granularity in the stomach body consistent with gastritis.  Biopsies obtained with cold forceps from stomach body to rule out H. pylori  4.  Medium size sliding hiatal hernia, 4 cm in length, Hill grade 4  5.   Normal mucosa in the duodenal bulb and second portion of the duodenum    Recommendations:  -Continue PPI daily and Pepcid nightly  -If persistent dysphagia recommend esophageal manometry as an outpatient  -Follow-up in the office as scheduled      LISA Correa MD     Date: 3/12/2025    Time: 09:53 EDT

## 2025-03-13 LAB
LAB AP CASE REPORT: NORMAL
PATH REPORT.FINAL DX SPEC: NORMAL
PATH REPORT.GROSS SPEC: NORMAL

## 2025-03-14 RX ORDER — SEMAGLUTIDE 1.7 MG/.75ML
INJECTION, SOLUTION SUBCUTANEOUS
Qty: 4 ML | Refills: 0 | Status: SHIPPED | OUTPATIENT
Start: 2025-03-14

## 2025-03-17 RX ORDER — BUSPIRONE HYDROCHLORIDE 15 MG/1
15 TABLET ORAL 2 TIMES DAILY
Qty: 180 TABLET | Refills: 0 | Status: SHIPPED | OUTPATIENT
Start: 2025-03-17

## 2025-04-03 RX ORDER — ESCITALOPRAM OXALATE 20 MG/1
20 TABLET ORAL DAILY
Qty: 90 TABLET | Refills: 0 | Status: SHIPPED | OUTPATIENT
Start: 2025-04-03

## 2025-05-21 RX ORDER — SEMAGLUTIDE 1.7 MG/.75ML
INJECTION, SOLUTION SUBCUTANEOUS
Qty: 4 ML | Refills: 0 | Status: SHIPPED | OUTPATIENT
Start: 2025-05-21

## 2025-06-02 RX ORDER — BUPROPION HYDROCHLORIDE 150 MG/1
150 TABLET ORAL 2 TIMES DAILY
Qty: 60 TABLET | Refills: 0 | Status: SHIPPED | OUTPATIENT
Start: 2025-06-02

## 2025-07-14 RX ORDER — BUPROPION HYDROCHLORIDE 150 MG/1
150 TABLET ORAL 2 TIMES DAILY
Qty: 60 TABLET | Refills: 3 | Status: SHIPPED | OUTPATIENT
Start: 2025-07-14

## 2025-07-16 RX ORDER — DICLOFENAC SODIUM 75 MG/1
75 TABLET, DELAYED RELEASE ORAL 2 TIMES DAILY WITH MEALS
Qty: 180 TABLET | Refills: 0 | Status: SHIPPED | OUTPATIENT
Start: 2025-07-16

## 2025-07-24 RX ORDER — SEMAGLUTIDE 1.7 MG/.75ML
1.7 INJECTION, SOLUTION SUBCUTANEOUS WEEKLY
Qty: 6 ML | Refills: 1 | Status: SHIPPED | OUTPATIENT
Start: 2025-07-24

## 2025-08-25 RX ORDER — ESCITALOPRAM OXALATE 20 MG/1
20 TABLET ORAL DAILY
Qty: 90 TABLET | Refills: 0 | Status: SHIPPED | OUTPATIENT
Start: 2025-08-25

## (undated) DEVICE — PK ENDO GI 50

## (undated) DEVICE — SINGLE-USE BIOPSY FORCEPS: Brand: RADIAL JAW 4

## (undated) DEVICE — BITEBLOCK ENDO W/STRAP 60F A/ LF DISP